# Patient Record
Sex: MALE | Race: WHITE | NOT HISPANIC OR LATINO | Employment: FULL TIME | URBAN - METROPOLITAN AREA
[De-identification: names, ages, dates, MRNs, and addresses within clinical notes are randomized per-mention and may not be internally consistent; named-entity substitution may affect disease eponyms.]

---

## 2017-04-17 ENCOUNTER — HOSPITAL ENCOUNTER (EMERGENCY)
Facility: OTHER | Age: 62
Discharge: HOME | End: 2017-04-18
Attending: EMERGENCY MEDICINE

## 2017-04-17 DIAGNOSIS — M54.2 NECK PAIN ON LEFT SIDE: ICD-10-CM

## 2017-04-17 DIAGNOSIS — R42 DIZZINESS: Primary | ICD-10-CM

## 2017-04-17 LAB
ALBUMIN SERPL-MCNC: 3.9 G/DL (ref 3.3–5.5)
ALP SERPL-CCNC: 67 U/L (ref 42–141)
BILIRUB SERPL-MCNC: 0.4 MG/DL (ref 0.2–1.6)
BUN SERPL-MCNC: 17 MG/DL (ref 7–22)
CALCIUM SERPL-MCNC: 9.4 MG/DL (ref 8–10.3)
CHLORIDE SERPL-SCNC: 105 MMOL/L (ref 98–108)
CREAT SERPL-MCNC: 1.1 MG/DL (ref 0.6–1.2)
GLUCOSE SERPL-MCNC: 99 MG/DL (ref 73–118)
POC ALT (SGPT): 20 U/L (ref 10–47)
POC AST (SGOT): 29 U/L (ref 11–38)
POC B-TYPE NATRIURETIC PEPTIDE: 14.8 PG/ML (ref 0–100)
POC CARDIAC TROPONIN I: 0 NG/ML
POC D-DI: <100 NG/ML (ref 0–450)
POC TCO2: 31 MMOL/L (ref 18–33)
POTASSIUM BLD-SCNC: 4.4 MMOL/L (ref 3.6–5.1)
PROTEIN, POC: 7.1 G/DL (ref 6.4–8.1)
SAMPLE: NORMAL
SODIUM BLD-SCNC: 138 MMOL/L (ref 128–145)

## 2017-04-17 PROCEDURE — 80185 ASSAY OF PHENYTOIN TOTAL: CPT

## 2017-04-17 PROCEDURE — 93010 ELECTROCARDIOGRAM REPORT: CPT | Mod: ,,, | Performed by: INTERNAL MEDICINE

## 2017-04-17 PROCEDURE — 93005 ELECTROCARDIOGRAM TRACING: CPT

## 2017-04-17 PROCEDURE — 99284 EMERGENCY DEPT VISIT MOD MDM: CPT | Mod: 25

## 2017-04-17 PROCEDURE — 80184 ASSAY OF PHENOBARBITAL: CPT

## 2017-04-17 NOTE — ED AVS SNAPSHOT
[facility] EMERGENCY DEPARTMENT  [address] [place] [address]               Jerry Durán   2017 10:05 PM   ED    Description:  Male : 1955   Department:  [facility] Emergency Department           Your Care was Coordinated By:     Provider Role From To    Kan Figueroa MD Attending Provider 17 [id number] --      Reason for Visit     Oral Swelling           Diagnoses this Visit        Comments    Dizziness    -  Primary     Neck pain on left side           ED Disposition     ED Disposition Condition Comment    Discharge  Patient discharged to home in stable condition.              To Do List           Follow-up Information     Follow up with Your PCP. Call in 1 day.    Why:  to schedule an appointment, for re-evaluation of today's complaint to arrange outpatient carotid ultrasound      [facility]     [facility] On Call Nurse Care Line -  Assistance  Unless otherwise directed by your provider, please contact [facility], our nurse care line that is available for  assistance.     Registered nurses in the [facility] On Call Center provide: appointment scheduling, clinical advisement, health education, and other advisory services.  Call: [phone] (toll free)               Medications           Message regarding Medications     Verify the changes and/or additions to your medication regime listed below are the same as discussed with your clinician today.  If any of these changes or additions are incorrect, please notify your healthcare provider.             Verify that the below list of medications is an accurate representation of the medications you are currently taking.  If none reported, the list may be blank. If incorrect, please contact your healthcare provider. Carry this list with you in case of emergency.                Clinical Reference Information           Your Vitals Were     BP Pulse Temp Resp Height Weight    198/101 69 98 °F (36.7 °C) 18 6' (1.829 m) 93 kg  (205 lb)    SpO2 BMI             100% 27.8 kg/m2         Allergies as of 4/18/2017     No Known Allergies      Immunizations Administered on Date of Encounter - 4/18/2017     None      ED Micro, Lab, POCT     Start Ordered       Status Ordering Provider    04/17/17 2334 04/17/17 2334  POCT B-type natriuretic peptide (BNP)  Once      Final result     04/17/17 2332 04/17/17 2332  Troponin ISTAT  Once      Final result     04/17/17 2331 04/17/17 2331  POCT D Dimer  Once      Final result     04/17/17 2307 04/17/17 2307  POCT CMP  Once      Final result     04/17/17 2235 04/17/17 2234  Phenytoin level, total  STAT      In process     04/17/17 2235 04/17/17 2234  Phenobarbital level  STAT      In process     04/17/17 2234 04/17/17 2234  POCT CBC  Once      Acknowledged     04/17/17 2234 04/17/17 2234  POCT CMP  Once      Completed     04/17/17 2234 04/17/17 2234  POCT B-type natriuretic peptide (BNP)  Once      Completed     04/17/17 2234 04/17/17 2234  POCT Troponin  Once      Completed     04/17/17 2234 04/17/17 2234  POCT D Dimer  Once      Completed       ED Imaging Orders     None        Discharge Instructions         Possible Causes of Dizziness or Fainting  Dizziness and fainting can have many causes. Below are some examples of possible causes your healthcare provider will look to rule out.    Benign paroxysmal positional vertigo (BPPV)  BPPV results when calcium crystals inside the inner ear shift into the wrong position. BPPV causes episodes of vertigo (a spinning sensation). Episodes most often happen when the head is moved in a certain way. This is more common in people 65 and older.   Infection or inflammation  The semicircular canals of the ear may become infected or inflamed. In this case, they can send the wrong balance signals. This can cause vertigo.  Meniere disease  Meniere disease happens when there is too much fluid in the semicircular canals. This can cause vertigo. It also can cause hearing  problems and buzzing or ringing in the ears (called tinnitus). You may also have a feeling of pressure or fullness in the ear.  Syncope  Syncope is fainting that happens when the brain doesnt get enough oxygen-rich blood. It can be caused by low heart rate or low blood pressure. This is called vasovagal syncope. It can also be caused by sitting or standing up too quickly. This is called orthostatic hypotension. Syncope may also be due to a heart valve problem, an abnormal heart rhythm, or other heart problems. Dizziness can also happen from stroke, hemorrhage in the brain, or other problems in the brain. Your healthcare provider may do certain tests to rule out these conditions.  Other causes  Other causes include:  · Medicines. Certain medicines can cause dizziness and even fainting. In some cases, stopping a medicine too quickly can lead to withdrawal symptoms, including dizziness and fainting.  · Anxiety. Being anxious can lead to breathing changes, such as hyperventilation. These can lead to dizziness and fainting.  Additional causes for dizziness and fainting also exist. Talk to your healthcare provider for more information.     Date Last Reviewed: 10/6/2015  © 5672-8022 [employer]. [address] [place] [address]. All rights reserved. This information is not intended as a substitute for professional medical care. Always follow your healthcare professional's instructions.          [facility] Sign-Up     Activating your [facility] account is as easy as 1-2-3!     1) Visit [email or web address], select Sign Up Now, enter this activation code and your date of birth, then select Next.  [email or web address]  Expires: 6/1/2017 11:59 PM      2) Create a username and password to use when you visit [facility] in the future and select a security question in case you lose your password and select Next.    3) Enter your e-mail address and click Sign Up!    Additional Information  If you have questions, please  e-mail [email or web address] or call [phone] to talk to our [facility] staff. Remember, [facility] is NOT to be used for urgent needs. For medical emergencies, dial 911.          [facility] Emergency Department complies with applicable Federal civil rights laws and does not discriminate on the basis of race, color, national origin, age, disability, or sex.        Language Assistance Services     ATTENTION: Language assistance services are available, free of charge. Please call [phone].      ATENCIÓN: Si habla español, tiene a loredo disposición servicios gratuitos de asistencia lingüística. Llame al [phone].     CHÚ Ý: N?u b?n nói Ti?ng Vi?t, có các d?ch v? h? tr? ngôn ng? mi?n phí dành cho b?n. G?i s? [phone].

## 2017-04-18 VITALS
HEIGHT: 72 IN | SYSTOLIC BLOOD PRESSURE: 172 MMHG | RESPIRATION RATE: 18 BRPM | DIASTOLIC BLOOD PRESSURE: 94 MMHG | BODY MASS INDEX: 27.77 KG/M2 | TEMPERATURE: 98 F | HEART RATE: 75 BPM | OXYGEN SATURATION: 99 % | WEIGHT: 205 LBS

## 2017-04-18 LAB
PHENOBARB SERPL-MCNC: 18.9 UG/DL
PHENYTOIN SERPL-MCNC: 13.7 UG/ML

## 2017-04-18 PROCEDURE — 80053 COMPREHEN METABOLIC PANEL: CPT

## 2017-04-18 PROCEDURE — 36415 COLL VENOUS BLD VENIPUNCTURE: CPT

## 2017-04-18 PROCEDURE — 85379 FIBRIN DEGRADATION QUANT: CPT

## 2017-04-18 PROCEDURE — 85025 COMPLETE CBC W/AUTO DIFF WBC: CPT

## 2017-04-18 PROCEDURE — 83880 ASSAY OF NATRIURETIC PEPTIDE: CPT

## 2017-04-18 PROCEDURE — 84484 ASSAY OF TROPONIN QUANT: CPT

## 2017-04-18 NOTE — DISCHARGE INSTRUCTIONS
Possible Causes of Dizziness or Fainting  Dizziness and fainting can have many causes. Below are some examples of possible causes your healthcare provider will look to rule out.    Benign paroxysmal positional vertigo (BPPV)  BPPV results when calcium crystals inside the inner ear shift into the wrong position. BPPV causes episodes of vertigo (a spinning sensation). Episodes most often happen when the head is moved in a certain way. This is more common in people 65 and older.   Infection or inflammation  The semicircular canals of the ear may become infected or inflamed. In this case, they can send the wrong balance signals. This can cause vertigo.  Meniere disease  Meniere disease happens when there is too much fluid in the semicircular canals. This can cause vertigo. It also can cause hearing problems and buzzing or ringing in the ears (called tinnitus). You may also have a feeling of pressure or fullness in the ear.  Syncope  Syncope is fainting that happens when the brain doesnt get enough oxygen-rich blood. It can be caused by low heart rate or low blood pressure. This is called vasovagal syncope. It can also be caused by sitting or standing up too quickly. This is called orthostatic hypotension. Syncope may also be due to a heart valve problem, an abnormal heart rhythm, or other heart problems. Dizziness can also happen from stroke, hemorrhage in the brain, or other problems in the brain. Your healthcare provider may do certain tests to rule out these conditions.  Other causes  Other causes include:  · Medicines. Certain medicines can cause dizziness and even fainting. In some cases, stopping a medicine too quickly can lead to withdrawal symptoms, including dizziness and fainting.  · Anxiety. Being anxious can lead to breathing changes, such as hyperventilation. These can lead to dizziness and fainting.  Additional causes for dizziness and fainting also exist. Talk to your healthcare provider for more  information.     Date Last Reviewed: 10/6/2015  © 8960-8669 [employer]. [address] [place] [address], [place] [address]. All rights reserved. This information is not intended as a substitute for professional medical care. Always follow your healthcare professional's instructions.

## 2017-04-18 NOTE — ED PROVIDER NOTES
Encounter Date: 4/17/2017       History     Chief Complaint   Patient presents with    Oral Swelling     pt c/o swelling to L side of neck, pt states that symptoms have improved s/p takin OTC asa, pt denies difficulty swallowing and breathing at this time     Review of patient's allergies indicates:  No Known Allergies  HPI 61 y.o. male presents to ED c/o  Acute dizziness yesterday while walking which resolved after sitting down for a few minutes.  Left-sided neck pressure today for 3-4 minutes. Symptoms currently resolved.   Denies history of MI, stroke    History of epilepsy on Dilantin and Phenobarbital. Last seizure 17 years ago.  Has not seen a PCP in over 4 years.     Past Medical History:   Diagnosis Date    Seizures      Past Surgical History:   Procedure Laterality Date    KNEE SURGERY       No family history on file.  Social History   Substance Use Topics    Smoking status: Never Smoker    Smokeless tobacco: None    Alcohol use No     Review of Systems   Constitutional: Negative for appetite change, chills, diaphoresis, fatigue and fever.   HENT: Negative.  Negative for sore throat, trouble swallowing and voice change.    Eyes: Negative.  Negative for visual disturbance.   Respiratory: Negative for cough, shortness of breath and stridor.    Cardiovascular: Negative for chest pain, palpitations and leg swelling.   Gastrointestinal: Negative for abdominal pain, constipation, nausea and vomiting.   Genitourinary: Negative for dysuria and hematuria.   Musculoskeletal: Positive for neck pain (left anterior neck pressure). Negative for back pain and gait problem.   Skin: Negative.    Neurological: Positive for dizziness. Negative for tremors, syncope, speech difficulty, weakness, light-headedness, numbness and headaches.   Psychiatric/Behavioral: Negative.    All other systems reviewed and are negative.      Physical Exam   Initial Vitals   BP Pulse Resp Temp SpO2   04/17/17 2100 04/17/17 2100 04/17/17  2100 04/17/17 2100 04/17/17 2100   198/101 69 18 98 °F (36.7 °C) 100 %     Physical Exam    Nursing note and vitals reviewed.  Constitutional: He appears well-developed and well-nourished. He is not diaphoretic. No distress.   HENT:   Head: Normocephalic and atraumatic.   Mouth/Throat: Oropharynx is clear and moist. No oropharyngeal exudate.   Eyes: EOM are normal. Pupils are equal, round, and reactive to light. No scleral icterus.   Neck: Normal range of motion and phonation normal. Neck supple. No stridor present. No tracheal tenderness present.   No carotid bruits appreciated on bilateral auscultation.   Cardiovascular: Normal rate, regular rhythm and intact distal pulses.   No murmur heard.  Pulmonary/Chest: Breath sounds normal. No stridor. No respiratory distress. He has no wheezes. He has no rhonchi. He exhibits no tenderness.   Abdominal: Soft. Bowel sounds are normal. There is no tenderness.   Musculoskeletal: Normal range of motion. He exhibits no edema.   Neurological: He is alert and oriented to person, place, and time. He has normal strength.   Skin: Skin is warm. No pallor.   Psychiatric: He has a normal mood and affect.         ED Course   Procedures  Labs Reviewed   POCT D DIMER - Abnormal; Notable for the following:        Result Value    POC D-DI <100 (*)     All other components within normal limits   PHENYTOIN LEVEL, TOTAL   PHENOBARBITAL LEVEL   TROPONIN ISTAT   POCT CMP   POCT B-TYPE NATRIURETIC PEPTIDE (BNP)   POCT TROPONIN   POCT D DIMER   POCT CMP   POCT B-TYPE NATRIURETIC PEPTIDE (BNP)     EKG Readings: (Independently Interpreted)   Initial Reading: No STEMI. Heart Rate: 62. T Waves Flipped: III. Axis: Normal. Other Impression: TW flattened aVF, V4-V6          Medical Decision Making:   Clinical Tests:   Lab Tests: Ordered and Reviewed  The following lab test(s) were unremarkable: CBC, CMP, D-Dimer, BNP and Troponin       <> Summary of Lab: White count 7.1 H&H 14.4 and 43.0 platelets 221  MCV 87.9 RDW 13  Medical Tests: Ordered  ED Management:      Patient call back info:  [phone] cell  [phone] wife cell                   ED Course     Labs Reviewed  Admission on 04/17/2017, Discharged on 04/18/2017   Component Date Value Ref Range Status    Phenytoin Lvl 04/17/2017 13.7  10.0 - 20.0 ug/mL Final    Phenobarbital 04/17/2017 18.9  15.0 - 40.0 ug/dL Final    Albumin, POC 04/17/2017 3.9  3.3 - 5.5 g/dL Final    Alkaline Phosphatase, POC 04/17/2017 67  42 - 141 U/L Final    ALT (SGPT), POC 04/17/2017 20  10 - 47 U/L Final    AST (SGOT), POC 04/17/2017 29  11 - 38 U/L Final    POC BUN 04/17/2017 17  7 - 22 mg/dL Final    Calcium, POC 04/17/2017 9.4  8.0 - 10.3 mg/dL Final    POC Chloride 04/17/2017 105  98 - 108 mmol/L Final    POC Creatinine 04/17/2017 1.1  0.6 - 1.2 mg/dL Final    POC Glucose 04/17/2017 99  73 - 118 mg/dL Final    POC Potassium 04/17/2017 4.4  3.6 - 5.1 mmol/L Final    POC Sodium 04/17/2017 138  128 - 145 mmol/L Final    Bilirubin 04/17/2017 0.4  0.2 - 1.6 mg/dL Final    POC TCO2 04/17/2017 31  18 - 33 mmol/L Final    Protein 04/17/2017 7.1  6.4 - 8.1 g/dL Final    POC D-DI 04/17/2017 <100* 0.0 - 450.0 ng/mL Final    POC B-Type Natriuretic Peptide 04/17/2017 14.8  0.0 - 100.0 pg/mL Final    POC Cardiac Troponin I 04/17/2017 0.00  <0.09 ng/mL Final    Sample 04/17/2017 UNK   Final        Imaging Reviewed    Imaging Results     None          Medications given in ED    Medications - No data to display     Patient discharged to home in stable condition with instructions to:   1. Please take all meds as prescribed.  2. Follow-up with your primary care doctor   3. Return precautions discussed and patient and/or family/caretaker understands to return to the emergency room for any concerns including worsening of your current symptoms, fever, chills, night sweats, worsening pain, chest pain, shortness of breath, nausea, vomiting, diarrhea, bleeding, headache,  difficulty talking, visual disturbances, weakness, numbness or any other acute concerns      Clinical Impression:   The primary encounter diagnosis was Dizziness. A diagnosis of Neck pain on left side was also pertinent to this visit.          Kan Figueroa MD  04/19/17 2006

## 2019-03-06 ENCOUNTER — OFFICE VISIT (OUTPATIENT)
Dept: URGENT CARE | Facility: CLINIC | Age: 64
End: 2019-03-06
Payer: COMMERCIAL

## 2019-03-06 VITALS
TEMPERATURE: 97 F | WEIGHT: 205 LBS | DIASTOLIC BLOOD PRESSURE: 88 MMHG | HEART RATE: 80 BPM | OXYGEN SATURATION: 98 % | BODY MASS INDEX: 27.77 KG/M2 | SYSTOLIC BLOOD PRESSURE: 140 MMHG | HEIGHT: 72 IN

## 2019-03-06 DIAGNOSIS — L02.214 ABSCESS OF RIGHT GROIN: Primary | ICD-10-CM

## 2019-03-06 PROCEDURE — 96372 PR INJECTION,THERAP/PROPH/DIAG2ST, IM OR SUBCUT: ICD-10-PCS | Mod: S$GLB,,, | Performed by: SURGERY

## 2019-03-06 PROCEDURE — 99203 PR OFFICE/OUTPT VISIT, NEW, LEVL III, 30-44 MIN: ICD-10-PCS | Mod: 25,S$GLB,, | Performed by: SURGERY

## 2019-03-06 PROCEDURE — 3008F BODY MASS INDEX DOCD: CPT | Mod: CPTII,S$GLB,, | Performed by: SURGERY

## 2019-03-06 PROCEDURE — 3008F PR BODY MASS INDEX (BMI) DOCUMENTED: ICD-10-PCS | Mod: CPTII,S$GLB,, | Performed by: SURGERY

## 2019-03-06 PROCEDURE — 99203 OFFICE O/P NEW LOW 30 MIN: CPT | Mod: 25,S$GLB,, | Performed by: SURGERY

## 2019-03-06 PROCEDURE — 96372 THER/PROPH/DIAG INJ SC/IM: CPT | Mod: S$GLB,,, | Performed by: SURGERY

## 2019-03-06 RX ORDER — CEFTRIAXONE 500 MG/1
500 INJECTION, POWDER, FOR SOLUTION INTRAMUSCULAR; INTRAVENOUS
Status: COMPLETED | OUTPATIENT
Start: 2019-03-06 | End: 2019-03-06

## 2019-03-06 RX ORDER — AMOXICILLIN AND CLAVULANATE POTASSIUM 875; 125 MG/1; MG/1
1 TABLET, FILM COATED ORAL 2 TIMES DAILY
Qty: 14 TABLET | Refills: 0 | Status: SHIPPED | OUTPATIENT
Start: 2019-03-06 | End: 2019-03-13

## 2019-03-06 RX ADMIN — CEFTRIAXONE 500 MG: 500 INJECTION, POWDER, FOR SOLUTION INTRAMUSCULAR; INTRAVENOUS at 06:03

## 2019-03-06 NOTE — PROGRESS NOTES
Subjective:       Patient ID: Jerry Durán is a 63 y.o. male.    Vitals:  height is 6' (1.829 m) and weight is 93 kg (205 lb). His temperature is 97.1 °F (36.2 °C). His blood pressure is 140/88 (abnormal) and his pulse is 80. His oxygen saturation is 98%.     Chief Complaint: Abscess (right testicle)    Pt states having a abscess on his right testicle       Abscess   Chronicity:  NewProgression Since Onset: gradually worsening  Location:  Ano-genital  Associated Symptoms: no fever, no chills  Characteristics: draining and painful    Pain Scale:  4/10  Treatments Tried:  Warm compresses      Constitution: Negative for chills, fatigue and fever.   HENT: Negative for congestion and sore throat.    Neck: Negative for painful lymph nodes.   Cardiovascular: Negative for chest pain and leg swelling.   Eyes: Negative for double vision and blurred vision.   Respiratory: Negative for cough and shortness of breath.    Gastrointestinal: Negative for nausea, vomiting and diarrhea.   Genitourinary: Negative for dysuria, frequency and urgency.   Musculoskeletal: Negative for joint pain, joint swelling, muscle cramps and muscle ache.   Skin: Positive for abscess. Negative for color change, pale, rash and erythema.   Allergic/Immunologic: Negative for seasonal allergies.   Neurological: Negative for dizziness, history of vertigo, light-headedness, passing out and headaches.   Hematologic/Lymphatic: Negative for swollen lymph nodes, easy bruising/bleeding and history of blood clots. Does not bruise/bleed easily.   Psychiatric/Behavioral: Negative for nervous/anxious, sleep disturbance and depression. The patient is not nervous/anxious.        Objective:      Physical Exam   Constitutional: He is oriented to person, place, and time. He appears well-developed and well-nourished.   HENT:   Head: Normocephalic and atraumatic. Head is without abrasion, without contusion and without laceration.   Right Ear: External ear normal.   Left  Ear: External ear normal.   Nose: Nose normal.   Mouth/Throat: Oropharynx is clear and moist.   Eyes: Conjunctivae, EOM and lids are normal. Pupils are equal, round, and reactive to light.   Neck: Trachea normal, full passive range of motion without pain and phonation normal. Neck supple.   Cardiovascular: Normal rate, regular rhythm and normal heart sounds.   Pulmonary/Chest: Effort normal and breath sounds normal. No stridor. No respiratory distress.   Genitourinary: Testes normal and penis normal.         Musculoskeletal: Normal range of motion.   Neurological: He is alert and oriented to person, place, and time.   Skin: Skin is warm, dry and intact. Capillary refill takes less than 2 seconds. No abrasion, no bruising, no burn, no ecchymosis, no laceration, no lesion and no rash noted. No erythema.   Psychiatric: He has a normal mood and affect. His speech is normal and behavior is normal. Judgment and thought content normal. Cognition and memory are normal.   Nursing note and vitals reviewed.      Assessment:       1. Abscess of right groin        Plan:         Abscess of right groin  -     cefTRIAXone injection 500 mg  -     amoxicillin-clavulanate 875-125mg (AUGMENTIN) 875-125 mg per tablet; Take 1 tablet by mouth 2 (two) times daily. for 7 days  Dispense: 14 tablet; Refill: 0      Patient Instructions     Abscess (Antibiotic Treatment Only)  An abscess (sometimes called a boil) happens when bacteria get trapped under the skin and start to grow. Pus forms inside the abscess as the body responds to the bacteria. An abscess can happen with an insect bite, ingrown hair, blocked oil gland, pimple, cyst, or puncture wound.  In the early stages, your wound may be red and tender. For this stage, you may get antibiotics. If the abscess does not get better with antibiotics, it will need to be drained with a small cut.  Home care  These tips will help you care for your abscess at home:  · Soak the wound in hot water  or apply hot packs (small towel soaked in hot water) to the area for 20 minutes at a time. Do this 3 to 4 times a day.  · Do not cut, squeeze, or pop the boil yourself.  · Apply antibiotic cream or ointment to the skin 3 to 4 times a day, unless something else was prescribed. Some ointments include an antibiotic plus a pain reliever.  · If your doctor prescribed antibiotics, do not stop taking them until you have finished the medicine or the doctor tells you to stop.  · You may use an over-the-counter pain medicine to control pain, unless another pain medicine was prescribed. If you have chronic liver or kidney disease or ever had a stomach ulcer or gastrointestinal bleeding, talk with your doctor before using these any of these.  Follow-up care  Follow up with your healthcare provider, or as advised. Check your wound each day for the signs of worsening infection listed below.  When to seek medical advice  Get prompt medical attention if any of these occur:  · An increase in redness or swelling  · Red streaks in the skin leading away from the abscess  · An increase in local pain or swelling  · Fever of 100.4ºF (38ºC) or higher, or as directed by your healthcare provider  · Pus or fluid coming from the abscess  · Boil returns after getting better  Date Last Reviewed: 9/1/2016  © 4751-0528 [employer]. [address] [place] [address], [place] [address]. All rights reserved. This information is not intended as a substitute for professional medical care. Always follow your healthcare professional's instructions.

## 2019-03-07 NOTE — PATIENT INSTRUCTIONS
Abscess (Antibiotic Treatment Only)  An abscess (sometimes called a boil) happens when bacteria get trapped under the skin and start to grow. Pus forms inside the abscess as the body responds to the bacteria. An abscess can happen with an insect bite, ingrown hair, blocked oil gland, pimple, cyst, or puncture wound.  In the early stages, your wound may be red and tender. For this stage, you may get antibiotics. If the abscess does not get better with antibiotics, it will need to be drained with a small cut.  Home care  These tips will help you care for your abscess at home:  · Soak the wound in hot water or apply hot packs (small towel soaked in hot water) to the area for 20 minutes at a time. Do this 3 to 4 times a day.  · Do not cut, squeeze, or pop the boil yourself.  · Apply antibiotic cream or ointment to the skin 3 to 4 times a day, unless something else was prescribed. Some ointments include an antibiotic plus a pain reliever.  · If your doctor prescribed antibiotics, do not stop taking them until you have finished the medicine or the doctor tells you to stop.  · You may use an over-the-counter pain medicine to control pain, unless another pain medicine was prescribed. If you have chronic liver or kidney disease or ever had a stomach ulcer or gastrointestinal bleeding, talk with your doctor before using these any of these.  Follow-up care  Follow up with your healthcare provider, or as advised. Check your wound each day for the signs of worsening infection listed below.  When to seek medical advice  Get prompt medical attention if any of these occur:  · An increase in redness or swelling  · Red streaks in the skin leading away from the abscess  · An increase in local pain or swelling  · Fever of 100.4ºF (38ºC) or higher, or as directed by your healthcare provider  · Pus or fluid coming from the abscess  · Boil returns after getting better  Date Last Reviewed: 9/1/2016  © 2565-7788 [employer] [address] [place] [address]. All rights reserved. This information is not intended as a substitute for professional medical care. Always follow your healthcare professional's instructions.

## 2019-03-20 ENCOUNTER — OFFICE VISIT (OUTPATIENT)
Dept: URGENT CARE | Facility: CLINIC | Age: 64
End: 2019-03-20
Payer: COMMERCIAL

## 2019-03-20 VITALS
BODY MASS INDEX: 27.8 KG/M2 | HEART RATE: 57 BPM | SYSTOLIC BLOOD PRESSURE: 175 MMHG | DIASTOLIC BLOOD PRESSURE: 93 MMHG | WEIGHT: 205 LBS | OXYGEN SATURATION: 100 % | TEMPERATURE: 98 F

## 2019-03-20 DIAGNOSIS — L02.214 ABSCESS OF RIGHT GROIN: Primary | ICD-10-CM

## 2019-03-20 PROBLEM — R56.9 SEIZURE: Status: ACTIVE | Noted: 2019-03-20

## 2019-03-20 PROCEDURE — 99214 OFFICE O/P EST MOD 30 MIN: CPT | Mod: S$GLB,,, | Performed by: SURGERY

## 2019-03-20 PROCEDURE — 99214 PR OFFICE/OUTPT VISIT, EST, LEVL IV, 30-39 MIN: ICD-10-PCS | Mod: S$GLB,,, | Performed by: SURGERY

## 2019-03-20 PROCEDURE — 3008F PR BODY MASS INDEX (BMI) DOCUMENTED: ICD-10-PCS | Mod: CPTII,S$GLB,, | Performed by: SURGERY

## 2019-03-20 PROCEDURE — 3008F BODY MASS INDEX DOCD: CPT | Mod: CPTII,S$GLB,, | Performed by: SURGERY

## 2019-03-20 RX ORDER — PHENYTOIN SODIUM 100 MG/1
100 CAPSULE, EXTENDED RELEASE ORAL 2 TIMES DAILY
COMMUNITY

## 2019-03-20 RX ORDER — MUPIROCIN 20 MG/G
OINTMENT TOPICAL DAILY
Qty: 1 TUBE | Refills: 0 | Status: SHIPPED | OUTPATIENT
Start: 2019-03-20 | End: 2019-05-28

## 2019-03-20 RX ORDER — SULFAMETHOXAZOLE AND TRIMETHOPRIM 800; 160 MG/1; MG/1
1 TABLET ORAL 2 TIMES DAILY
Qty: 14 TABLET | Refills: 0 | Status: SHIPPED | OUTPATIENT
Start: 2019-03-20 | End: 2019-03-27

## 2019-03-20 NOTE — PROGRESS NOTES
Subjective:       Patient ID: Jerry Durán is a 63 y.o. male.    Vitals:  weight is 93 kg (205 lb). His temperature is 98 °F (36.7 °C). His blood pressure is 175/93 (abnormal) and his pulse is 57 (abnormal). His oxygen saturation is 100%.     Chief Complaint: Abscess    Pt completed all medication for his abscess. The affected area looks good over all. Pt states it is still draining and would like the area re-checked to make sure its healing.      Abscess   Chronicity: Recheck from 3/6/19 visit.Progression Since Onset: gradually improving  Abscess location: groin.  Associated Symptoms: no fever, no chills  Characteristics: draining    Pain Scale:  0/10  Treatments Tried:  Oral antibiotics and warm water soaks      Constitution: Negative for chills, fatigue and fever.   HENT: Negative for congestion and sore throat.    Neck: Negative for painful lymph nodes.   Cardiovascular: Negative for chest pain and leg swelling.   Eyes: Negative for double vision and blurred vision.   Respiratory: Negative for cough and shortness of breath.    Gastrointestinal: Negative for nausea, vomiting and diarrhea.   Genitourinary: Negative for dysuria, frequency and urgency.   Musculoskeletal: Negative for joint pain, joint swelling, muscle cramps and muscle ache.   Skin: Positive for erythema and abscess. Negative for color change, pale and rash.   Allergic/Immunologic: Negative for seasonal allergies.   Neurological: Negative for dizziness, history of vertigo, light-headedness, passing out and headaches.   Hematologic/Lymphatic: Negative for swollen lymph nodes, easy bruising/bleeding and history of blood clots. Does not bruise/bleed easily.   Psychiatric/Behavioral: Negative for nervous/anxious, sleep disturbance and depression. The patient is not nervous/anxious.        Objective:      Physical Exam   Constitutional: He is oriented to person, place, and time. He appears well-developed and well-nourished.   HENT:   Head: Normocephalic  and atraumatic. Head is without abrasion, without contusion and without laceration.   Right Ear: External ear normal.   Left Ear: External ear normal.   Nose: Nose normal.   Mouth/Throat: Oropharynx is clear and moist.   Eyes: Conjunctivae, EOM and lids are normal. Pupils are equal, round, and reactive to light.   Neck: Trachea normal, full passive range of motion without pain and phonation normal. Neck supple.   Cardiovascular: Normal rate, regular rhythm and normal heart sounds.   Pulmonary/Chest: Effort normal and breath sounds normal. No stridor. No respiratory distress.   Genitourinary:         Musculoskeletal: Normal range of motion.   Neurological: He is alert and oriented to person, place, and time.   Skin: Skin is warm, dry and intact. Capillary refill takes less than 2 seconds. No abrasion, no bruising, no burn, no ecchymosis, no laceration, no lesion and no rash noted. There is erythema.   Psychiatric: He has a normal mood and affect. His speech is normal and behavior is normal. Judgment and thought content normal. Cognition and memory are normal.   Nursing note and vitals reviewed.      Assessment:       1. Abscess of right groin        Plan:         Abscess of right groin  -     sulfamethoxazole-trimethoprim 800-160mg (BACTRIM DS) 800-160 mg Tab; Take 1 tablet by mouth 2 (two) times daily. for 7 days  Dispense: 14 tablet; Refill: 0  -     mupirocin (BACTROBAN) 2 % ointment; Apply topically once daily.  Dispense: 1 Tube; Refill: 0          Patient Instructions     Abscess (Antibiotic Treatment Only)  An abscess (sometimes called a boil) happens when bacteria get trapped under the skin and start to grow. Pus forms inside the abscess as the body responds to the bacteria. An abscess can happen with an insect bite, ingrown hair, blocked oil gland, pimple, cyst, or puncture wound.  In the early stages, your wound may be red and tender. For this stage, you may get antibiotics. If the abscess does not get  better with antibiotics, it will need to be drained with a small cut.  Home care  These tips will help you care for your abscess at home:  · Soak the wound in hot water or apply hot packs (small towel soaked in hot water) to the area for 20 minutes at a time. Do this 3 to 4 times a day.  · Do not cut, squeeze, or pop the boil yourself.  · Apply antibiotic cream or ointment to the skin 3 to 4 times a day, unless something else was prescribed. Some ointments include an antibiotic plus a pain reliever.  · If your doctor prescribed antibiotics, do not stop taking them until you have finished the medicine or the doctor tells you to stop.  · You may use an over-the-counter pain medicine to control pain, unless another pain medicine was prescribed. If you have chronic liver or kidney disease or ever had a stomach ulcer or gastrointestinal bleeding, talk with your doctor before using these any of these.  Follow-up care  Follow up with your healthcare provider, or as advised. Check your wound each day for the signs of worsening infection listed below.  When to seek medical advice  Get prompt medical attention if any of these occur:  · An increase in redness or swelling  · Red streaks in the skin leading away from the abscess  · An increase in local pain or swelling  · Fever of 100.4ºF (38ºC) or higher, or as directed by your healthcare provider  · Pus or fluid coming from the abscess  · Boil returns after getting better  Date Last Reviewed: 9/1/2016  © 5700-3267 [employer]. [address] [place] [address], [place] [address]. All rights reserved. This information is not intended as a substitute for professional medical care. Always follow your healthcare professional's instructions.

## 2019-03-20 NOTE — PATIENT INSTRUCTIONS
Abscess (Antibiotic Treatment Only)  An abscess (sometimes called a boil) happens when bacteria get trapped under the skin and start to grow. Pus forms inside the abscess as the body responds to the bacteria. An abscess can happen with an insect bite, ingrown hair, blocked oil gland, pimple, cyst, or puncture wound.  In the early stages, your wound may be red and tender. For this stage, you may get antibiotics. If the abscess does not get better with antibiotics, it will need to be drained with a small cut.  Home care  These tips will help you care for your abscess at home:  · Soak the wound in hot water or apply hot packs (small towel soaked in hot water) to the area for 20 minutes at a time. Do this 3 to 4 times a day.  · Do not cut, squeeze, or pop the boil yourself.  · Apply antibiotic cream or ointment to the skin 3 to 4 times a day, unless something else was prescribed. Some ointments include an antibiotic plus a pain reliever.  · If your doctor prescribed antibiotics, do not stop taking them until you have finished the medicine or the doctor tells you to stop.  · You may use an over-the-counter pain medicine to control pain, unless another pain medicine was prescribed. If you have chronic liver or kidney disease or ever had a stomach ulcer or gastrointestinal bleeding, talk with your doctor before using these any of these.  Follow-up care  Follow up with your healthcare provider, or as advised. Check your wound each day for the signs of worsening infection listed below.  When to seek medical advice  Get prompt medical attention if any of these occur:  · An increase in redness or swelling  · Red streaks in the skin leading away from the abscess  · An increase in local pain or swelling  · Fever of 100.4ºF (38ºC) or higher, or as directed by your healthcare provider  · Pus or fluid coming from the abscess  · Boil returns after getting better  Date Last Reviewed: 9/1/2016  © 8467-8502 [employer] [address] [place] [address]. All rights reserved. This information is not intended as a substitute for professional medical care. Always follow your healthcare professional's instructions.

## 2019-05-28 ENCOUNTER — OFFICE VISIT (OUTPATIENT)
Dept: FAMILY MEDICINE | Facility: CLINIC | Age: 64
End: 2019-05-28
Payer: COMMERCIAL

## 2019-05-28 VITALS
OXYGEN SATURATION: 95 % | DIASTOLIC BLOOD PRESSURE: 90 MMHG | WEIGHT: 200 LBS | TEMPERATURE: 98 F | HEART RATE: 68 BPM | HEIGHT: 72 IN | SYSTOLIC BLOOD PRESSURE: 168 MMHG | BODY MASS INDEX: 27.09 KG/M2

## 2019-05-28 DIAGNOSIS — L72.9 SCROTAL CYST: Primary | ICD-10-CM

## 2019-05-28 DIAGNOSIS — R03.0 ELEVATED BLOOD PRESSURE READING: ICD-10-CM

## 2019-05-28 PROCEDURE — 99999 PR PBB SHADOW E&M-EST. PATIENT-LVL IV: CPT | Mod: PBBFAC,,, | Performed by: INTERNAL MEDICINE

## 2019-05-28 PROCEDURE — 3008F PR BODY MASS INDEX (BMI) DOCUMENTED: ICD-10-PCS | Mod: CPTII,S$GLB,, | Performed by: INTERNAL MEDICINE

## 2019-05-28 PROCEDURE — 99999 PR PBB SHADOW E&M-EST. PATIENT-LVL IV: ICD-10-PCS | Mod: PBBFAC,,, | Performed by: INTERNAL MEDICINE

## 2019-05-28 PROCEDURE — 99203 OFFICE O/P NEW LOW 30 MIN: CPT | Mod: S$GLB,,, | Performed by: INTERNAL MEDICINE

## 2019-05-28 PROCEDURE — 99203 PR OFFICE/OUTPT VISIT, NEW, LEVL III, 30-44 MIN: ICD-10-PCS | Mod: S$GLB,,, | Performed by: INTERNAL MEDICINE

## 2019-05-28 PROCEDURE — 3008F BODY MASS INDEX DOCD: CPT | Mod: CPTII,S$GLB,, | Performed by: INTERNAL MEDICINE

## 2019-05-28 RX ORDER — PHENOBARBITAL 30 MG/1
30 TABLET ORAL EVERY MORNING
COMMUNITY
End: 2019-09-17 | Stop reason: ALTCHOICE

## 2019-05-28 NOTE — PROGRESS NOTES
This note was created by combination of typed  and Dragon dictation.  Transcription errors may be present.  If there are any questions, please contact me.    Assessment & Plan:   Scrotal cyst  -not related or bound to underlying structures, reassurance.  He does find it bothersome and after discussion I will refer him to Urology for evaluation and consideration of excision.  No indication for antibiotics at this time.  -     Ambulatory referral to Urology    Elevated blood pressure reading  -he reports that it is better outside the office and possibly some degree of white coat syndrome.  I have asked him to continue to monitor and to set up a physical exam at some point in the next 6 months for re-evaluation    Medications Discontinued During This Encounter   Medication Reason    mupirocin (BACTROBAN) 2 % ointment Patient no longer taking    phenobarbital in dextrose 5 % infusion        meds sent this encounter:       Follow Up: No follow-ups on file.    Subjective:     Chief Complaint   Patient presents with    Mass     Swelling of right testicle       AMOS Edwards is a 63 y.o. male, last appointment with this clinic was Visit date not found.    NP  His former PCP retired - Dr. Fernández - had been seeing him for 10 years.    Seen in March for abscess in the right groin.  Rocephin injection and Augmentin.    Comes in today complaining of a lump on the right side of his scrotum.  He is concerned that this may represent malignancy.  Went to UC and treated for infection b/c of purulent drainage.  But never went away completely.  Has decreased in size but did not resolve completely.  It is aggravating/annoying but no pain. No other areas of skin on body that develop cysts. No fever no chills.    Neuro Dr. Paredes until she retired.    Colonoscopy was age 52 at . Normal but due.    BP high on intake. BP rare outside checks - 120/80s. 130/80s.  Feels like there is a component of white coat syndrome.    Patient Care  Team:  Primary Doctor No as PCP - General  John Jones MD (Neurology)  Pricila Paredes MD as Consulting Physician (Neurology)  [facility] (Gastroenterology)    Patient Active Problem List    Diagnosis Date Noted    Seizure 03/20/2019       PAST MEDICAL HISTORY:  Past Medical History:   Diagnosis Date    Seizures        PAST SURGICAL HISTORY:  Past Surgical History:   Procedure Laterality Date    KNEE SURGERY       Family History   Problem Relation Age of Onset    Breast cancer Mother     No Known Problems Father     Thyroid disease Sister     No Known Problems Brother     No Known Problems Brother     No Known Problems Sister     No Known Problems Son     Seizures Daughter        SOCIAL HISTORY:  Social History     Socioeconomic History    Marital status:      Spouse name: Not on file    Number of children: Not on file    Years of education: Not on file    Highest education level: Not on file   Occupational History    Occupation: [employer] -    Social Needs    Financial resource strain: Not on file    Food insecurity:     Worry: Not on file     Inability: Not on file    Transportation needs:     Medical: Not on file     Non-medical: Not on file   Tobacco Use    Smoking status: Never Smoker   Substance and Sexual Activity    Alcohol use: No    Drug use: No    Sexual activity: Not on file   Lifestyle    Physical activity:     Days per week: Not on file     Minutes per session: Not on file    Stress: Not on file   Relationships    Social connections:     Talks on phone: Not on file     Gets together: Not on file     Attends Latter-day service: Not on file     Active member of club or organization: Not on file     Attends meetings of clubs or organizations: Not on file     Relationship status: Not on file   Other Topics Concern    Not on file   Social History Narrative    Not on file       ALLERGIES AND MEDICATIONS:  updated and reviewed.  Review of patient's allergies indicates:  No Known Allergies  Current Outpatient Medications   Medication Sig Dispense Refill    PHENobarbital (LUMINAL) 30 MG tablet 1 AM 2 in evening      phenytoin (DILANTIN EXTENDED) 100 MG ER capsule Take 100 tablets by mouth 3 (three) times daily. 1 in AM, 2 in PM      phenytoin (DILANTIN) 30 MG ER capsule Take 30 tablets by mouth once daily.       No current facility-administered medications for this visit.        Review of Systems   Constitutional: Negative for chills and fever.   Respiratory: Negative for cough.    Cardiovascular: Negative for chest pain and palpitations.   Gastrointestinal: Negative for abdominal pain.   Genitourinary: Negative for dysuria.   Neurological: Negative for headaches.       Objective:   Physical Exam   Vitals:    05/28/19 1527   BP: (!) 168/90   BP Location: Left arm   Patient Position: Sitting   BP Method: Medium (Manual)   Pulse: 68   Temp: 98 °F (36.7 °C)   TempSrc: Oral   SpO2: 95%   Weight: 90.7 kg (200 lb)   Height: 6' (1.829 m)    Body mass index is 27.12 kg/m².  Weight: 90.7 kg (200 lb)   Height: 6' (182.9 cm)     Physical Exam   Constitutional: He is oriented to person, place, and time. He appears well-developed and well-nourished. No distress.   Eyes: EOM are normal.   Cardiovascular: Normal rate, regular rhythm and normal heart sounds.   No murmur heard.  Pulmonary/Chest: Effort normal and breath sounds normal.   Genitourinary:   Genitourinary Comments: On the lateral surface of the scrotum as it meets with the perineal tissue, there is a subcutaneous cystic area, soft, it is about 25 mm long times 8 mm wide, without overlying comedone, no erythema no warmth, maybe a little fluctuant, but not firm not tender.  It is free of the underlying scrotal contents.  The testicles are unremarkable smooth in contour with no masses. Spermatic cord unremarkable.  No hernias.   Musculoskeletal: Normal range of motion.    Neurological: He is alert and oriented to person, place, and time. Coordination normal.   Skin: Skin is warm and dry.   Psychiatric: He has a normal mood and affect. His behavior is normal. Thought content normal.

## 2019-05-28 NOTE — LETTER
May 28, 2019    [facility]               [facility]  [address]  [place] [address]  Phone: [phone]  Fax: [phone] May 28, 2019     Patient: Jerry Durán    YOB: 1955   Date of Visit: 5/28/2019     To Whom It May Concern:    We are seeing Jerry Durán in the clinic at [facility].  Primary Doctor No is the patient's PCP.  She/He has an outstanding lab/procedure at the time we reviewed his/her chart.  To help with our Health Maintenance records will you please supply the following report(s)       (  )  MAMMOGRAM                                             ( x )  COLONOSCOPY      (  )  PAP SMEAR                                                 (  )  OUTSIDE LAB RESULTS     (  )  DEXA SCAN                                                 (  )  EYE EXAM            (  )  FOOT EXAM                                                 (  )  ENTIRE RECORD     (  )  OUTSIDE IMMUNIZATIONS                        (  )  _______________         Please Fax to [facility], Primary Doctor No [phone]     If any questions please contact [place] at [phone]

## 2019-06-05 ENCOUNTER — TELEPHONE (OUTPATIENT)
Dept: FAMILY MEDICINE | Facility: CLINIC | Age: 64
End: 2019-06-05

## 2019-06-05 NOTE — LETTER
June 5, 2019    Jerry Durán  [address] [facility] [address]             [facility]  [address] [facility] [address]  Phone: [phone]  Fax: [phone] Dear Mr. Durán:    I have been unable to reach you by phone for your appointment toUrology .  Please call me at the clinic [phone] to book your appointment.      If you have any questions or concerns, please don't hesitate to call.    Sincerely,        Nellie Melendrez MA

## 2019-06-13 ENCOUNTER — OFFICE VISIT (OUTPATIENT)
Dept: UROLOGY | Facility: CLINIC | Age: 64
End: 2019-06-13
Payer: COMMERCIAL

## 2019-06-13 VITALS
SYSTOLIC BLOOD PRESSURE: 118 MMHG | DIASTOLIC BLOOD PRESSURE: 62 MMHG | HEART RATE: 60 BPM | BODY MASS INDEX: 27.11 KG/M2 | RESPIRATION RATE: 14 BRPM | HEIGHT: 72 IN | WEIGHT: 200.19 LBS

## 2019-06-13 DIAGNOSIS — L02.214 GROIN ABSCESS: ICD-10-CM

## 2019-06-13 DIAGNOSIS — L72.9 SCROTAL CYST: Primary | ICD-10-CM

## 2019-06-13 PROCEDURE — 99999 PR PBB SHADOW E&M-EST. PATIENT-LVL III: CPT | Mod: PBBFAC,,, | Performed by: UROLOGY

## 2019-06-13 PROCEDURE — 99244 OFF/OP CNSLTJ NEW/EST MOD 40: CPT | Mod: S$GLB,,, | Performed by: UROLOGY

## 2019-06-13 PROCEDURE — 99244 PR OFFICE CONSULTATION,LEVEL IV: ICD-10-PCS | Mod: S$GLB,,, | Performed by: UROLOGY

## 2019-06-13 PROCEDURE — 99999 PR PBB SHADOW E&M-EST. PATIENT-LVL III: ICD-10-PCS | Mod: PBBFAC,,, | Performed by: UROLOGY

## 2019-06-13 RX ORDER — SULFAMETHOXAZOLE AND TRIMETHOPRIM 400; 80 MG/1; MG/1
1 TABLET ORAL 2 TIMES DAILY
Qty: 28 TABLET | Refills: 0 | Status: SHIPPED | OUTPATIENT
Start: 2019-06-13 | End: 2019-06-27

## 2019-06-13 NOTE — PROGRESS NOTES
"Subjective:       Jerry Durán is a 64 y.o. male who is a new patient who was referred by Dr Cabrales for evaluation of "scrotal cyst".      He reports lump in R scrotum. Saw PCP and  for this previously. At  in 3/19, he had purulent drainage but did not resolve completely after that. Drained spontaneously and I&D not performed. He has noted the "cyst" since then. He has had oozing from the same area since then. Pain resolved.     No GI issues. No prior abscess or skin cysts. Denies voiding problems.       The following portions of the patient's history were reviewed and updated as appropriate: allergies, current medications, past family history, past medical history, past social history, past surgical history and problem list.    Review of Systems  Constitutional: no fever or chills  ENT: no nasal congestion or sore throat  Respiratory: no cough or shortness of breath  Cardiovascular: no chest pain or palpitations  Gastrointestinal: no nausea or vomiting, tolerating diet  Genitourinary: as per HPI  Hematologic/Lymphatic: no easy bruising or lymphadenopathy  Musculoskeletal: no arthralgias or myalgias  Skin: no rashes or lesions  Neurological: no seizures or tremors  Behavioral/Psych: no auditory or visual hallucinations       Objective:    Vitals: /62   Pulse 60   Resp 14   Ht 6' (1.829 m)   Wt 90.8 kg (200 lb 2.8 oz)   BMI 27.15 kg/m²     Physical Exam   General: well developed, well nourished in no acute distress  Head: normocephalic, atraumatic  Neck: supple, trachea midline, no obvious enlargement of thyroid  HEENT: EOMI, mucus membranes moist, sclera anicteric, no hearing impairment  Lungs: symmetric expansion, non-labored breathing  Cardiovascular: regular rate and rhythm, normal pulses  Abdomen: soft, non tender, non distended, no palpable masses, no hepatosplenomegaly, no hernias, bladder nonpalpable. No CVA tenderness.  Musculoskeletal: no peripheral edema, normal ROM in bilateral upper and " lower extremities  Lymphatics: no cervical or inguinal lymphadenopathy  Skin: no rashes or lesions  Neuro: alert and oriented x 3, no gross deficits  Psych: normal judgment and insight, normal mood/affect and non-anxious  Genitourinary:   Penis -  circumcised penis without plaques, lesions, or scarring.  Urethra - orthotopic location without stenosis.  Scrotum  - no lesions or rashes, no hydrocele or hernia.  Testes - descended bilaterally, symmetric without masses, non tender.  Epididymides - no cysts or lesions, no spermatocele, symmetric   Oval shaped area in R proximal lateral scrotum (approx 4cm x 2cm) with 2 areas of white drainage noted. No cellulitis, crepitus. Nontender. Lesion is near cord structures.   ANDREW: patient declined exam      Lab Review   Urine analysis today in clinic shows Negative     No results found for: WBC, HGB, HCT, MCV, PLT  No results found for: CREATININE, BUN  No results found for: PSA  No results found for: PSADIAG    Imaging  NA       Assessment/Plan:      1. Scrotal cyst    - Discussed surgical excision. Does not seem well encapsulated but could try excision of abnormal area to allow closure of wound after. He is apprehensive for a procedure.    - Reviewed possible s/s of worsening infection requiring I&D requiring packing, healing by secondary intention.   - He has opted to try another round of abx first prior to surgery. Suspect this will not clear wound completely but okay to try first as wound appears stable.    - Bactrim x 14 d   - Recheck wound in ~2 weeks     2. Groin abscess    - Will monitor. Recommend excision       Follow up in 2 weeks

## 2019-06-13 NOTE — LETTER
June 13, 2019      Jasbir Cabrales MD  [address] [facility]  [place] [address]           [facility]  [address] [facility] [address]  [place] [address]  Phone: [phone]  Fax: [phone]          Patient: Jerry Durán   MR Number: [id number]   YOB: 1955   Date of Visit: 6/13/2019       Dear Dr. Jasbir Cabrales:    Thank you for referring Jerry Durán to me for evaluation. Attached you will find relevant portions of my assessment and plan of care.    If you have questions, please do not hesitate to call me. I look forward to following Jerry Durán along with you.    Sincerely,    aPt Garrett MD    Enclosure  CC:  No Recipients    If you would like to receive this communication electronically, please contact [email or web address] or [phone] to request more information on [employer] access.    For providers and/or their staff who would like to refer a patient to [facility], please contact us through our one-stop-shop provider referral line, [facility], at [phone].    If you feel you have received this communication in error or would no longer like to receive these types of communications, please e-mail [email or web address]

## 2019-06-19 ENCOUNTER — OFFICE VISIT (OUTPATIENT)
Dept: UROLOGY | Facility: CLINIC | Age: 64
End: 2019-06-19
Payer: COMMERCIAL

## 2019-06-19 VITALS — BODY MASS INDEX: 26.88 KG/M2 | HEIGHT: 72 IN | WEIGHT: 198.44 LBS | RESPIRATION RATE: 16 BRPM

## 2019-06-19 DIAGNOSIS — L72.9 SCROTAL CYST: Primary | ICD-10-CM

## 2019-06-19 PROCEDURE — 99999 PR PBB SHADOW E&M-EST. PATIENT-LVL III: CPT | Mod: PBBFAC,,, | Performed by: NURSE PRACTITIONER

## 2019-06-19 PROCEDURE — 3008F BODY MASS INDEX DOCD: CPT | Mod: CPTII,S$GLB,, | Performed by: NURSE PRACTITIONER

## 2019-06-19 PROCEDURE — 99999 PR PBB SHADOW E&M-EST. PATIENT-LVL III: ICD-10-PCS | Mod: PBBFAC,,, | Performed by: NURSE PRACTITIONER

## 2019-06-19 PROCEDURE — 99213 OFFICE O/P EST LOW 20 MIN: CPT | Mod: S$GLB,,, | Performed by: NURSE PRACTITIONER

## 2019-06-19 PROCEDURE — 3008F PR BODY MASS INDEX (BMI) DOCUMENTED: ICD-10-PCS | Mod: CPTII,S$GLB,, | Performed by: NURSE PRACTITIONER

## 2019-06-19 PROCEDURE — 99213 PR OFFICE/OUTPT VISIT, EST, LEVL III, 20-29 MIN: ICD-10-PCS | Mod: S$GLB,,, | Performed by: NURSE PRACTITIONER

## 2019-06-19 NOTE — PROGRESS NOTES
"Subjective:       Patient ID: Jerry Durán is a 64 y.o. male who is an established patient of Dr. Garrett though new to me was last seen in this office 6/13/2019    Chief Complaint:   Chief Complaint   Patient presents with    Follow-up     wound check     Per Dr. Garrett:    He reports lump in R scrotum. Saw PCP and  for this previously. At  in 3/19, he had purulent drainage but did not resolve completely after that. Drained spontaneously and I&D not performed. He has noted the "cyst" since then. He has had oozing from the same area since then. Pain resolved.     No GI issues. No prior abscess or skin cysts. Denies voiding problems.     He is here today for a wound check. He was prescribed Bactrim DS x 14 days at his previous visit with Dr. Garrett. He remains on his antibiotics. There has been no further drainage from his wound. Denies pain/tenderness, redness or fever.    ACTIVE MEDICAL ISSUES:  Patient Active Problem List   Diagnosis    Seizure       ALLERGIES AND MEDICATIONS: updated and reviewed.  Review of patient's allergies indicates:  No Known Allergies  Current Outpatient Medications   Medication Sig    PHENobarbital (LUMINAL) 30 MG tablet 1 AM 2 in evening    phenytoin (DILANTIN EXTENDED) 100 MG ER capsule Take 100 tablets by mouth 3 (three) times daily. 1 in AM, 2 in PM    phenytoin (DILANTIN) 30 MG ER capsule Take 30 tablets by mouth once daily.    sulfamethoxazole-trimethoprim 400-80mg (BACTRIM,SEPTRA) 400-80 mg per tablet Take 1 tablet by mouth 2 (two) times daily. for 14 days     No current facility-administered medications for this visit.        Review of Systems   Constitutional: Negative for activity change, chills, fatigue, fever and unexpected weight change.   Eyes: Negative for discharge, redness and visual disturbance.   Respiratory: Negative for cough, shortness of breath and wheezing.    Cardiovascular: Negative for chest pain and leg swelling.   Gastrointestinal: Negative for " abdominal distention, abdominal pain, constipation, diarrhea, nausea and vomiting.   Genitourinary: Negative for decreased urine volume, difficulty urinating, discharge, dysuria, flank pain, frequency, hematuria, penile pain, scrotal swelling, testicular pain and urgency.   Musculoskeletal: Negative for arthralgias, joint swelling and myalgias.   Skin: Positive for wound. Negative for color change and rash.   Neurological: Negative for dizziness and light-headedness.   Psychiatric/Behavioral: Negative for behavioral problems and confusion. The patient is not nervous/anxious.        Objective:      Vitals:    06/19/19 1525   Resp: 16   Weight: 90 kg (198 lb 6.6 oz)   Height: 6' (1.829 m)     Physical Exam   Nursing note and vitals reviewed.  Constitutional: He is oriented to person, place, and time. He appears well-developed.   HENT:   Head: Normocephalic and atraumatic.   Nose: Nose normal.   Eyes: Conjunctivae are normal. Right eye exhibits no discharge. Left eye exhibits no discharge.   Neck: Normal range of motion. Neck supple. No tracheal deviation present. No thyromegaly present.   Cardiovascular: Normal rate and regular rhythm.    Pulmonary/Chest: Effort normal. No stridor. No respiratory distress. He has no wheezes.   Abdominal: Soft. He exhibits no distension. There is no hepatosplenomegaly. There is no tenderness. There is no CVA tenderness. No hernia. Hernia confirmed negative in the right inguinal area and confirmed negative in the left inguinal area.   Genitourinary: Right testis shows no swelling and no tenderness. Left testis shows no swelling and no tenderness. Circumcised. No phimosis, penile erythema or penile tenderness. No discharge found.   Genitourinary Comments: Oval shaped lesion noted to R proximal scrotum (~3cm x 2 cm). No areas of drainage noted. No erythema, fluctuance or crepitus noted   Musculoskeletal: Normal range of motion. He exhibits no edema.   Neurological: He is alert and  oriented to person, place, and time.   Skin: Skin is warm and dry. No rash noted. No erythema.     Psychiatric: He has a normal mood and affect. His behavior is normal. Judgment normal.       Urine dipstick shows patient unable to produce specimen.     Assessment:       1. Scrotal cyst          Plan:       1. Scrotal cyst  -Previously discussed surgical excision with Dr. Garrett. He would still like to hold on this procedure for now.  -Continue Bactrim DS until medication completed  -Again reviewed ssx of worsening infection  -Recheck wound in 3 weeks once antibiotics are completed            Follow up in about 3 weeks (around 7/10/2019) for wound check.

## 2019-07-11 ENCOUNTER — OFFICE VISIT (OUTPATIENT)
Dept: UROLOGY | Facility: CLINIC | Age: 64
End: 2019-07-11
Payer: COMMERCIAL

## 2019-07-11 VITALS
BODY MASS INDEX: 27.36 KG/M2 | DIASTOLIC BLOOD PRESSURE: 72 MMHG | WEIGHT: 202 LBS | HEIGHT: 72 IN | SYSTOLIC BLOOD PRESSURE: 128 MMHG

## 2019-07-11 DIAGNOSIS — L02.214 GROIN ABSCESS: ICD-10-CM

## 2019-07-11 DIAGNOSIS — L72.9 SCROTAL CYST: Primary | ICD-10-CM

## 2019-07-11 PROCEDURE — 3008F PR BODY MASS INDEX (BMI) DOCUMENTED: ICD-10-PCS | Mod: CPTII,S$GLB,, | Performed by: UROLOGY

## 2019-07-11 PROCEDURE — 99999 PR PBB SHADOW E&M-EST. PATIENT-LVL III: ICD-10-PCS | Mod: PBBFAC,,, | Performed by: UROLOGY

## 2019-07-11 PROCEDURE — 99214 OFFICE O/P EST MOD 30 MIN: CPT | Mod: S$GLB,,, | Performed by: UROLOGY

## 2019-07-11 PROCEDURE — 99214 PR OFFICE/OUTPT VISIT, EST, LEVL IV, 30-39 MIN: ICD-10-PCS | Mod: S$GLB,,, | Performed by: UROLOGY

## 2019-07-11 PROCEDURE — 99999 PR PBB SHADOW E&M-EST. PATIENT-LVL III: CPT | Mod: PBBFAC,,, | Performed by: UROLOGY

## 2019-07-11 PROCEDURE — 3008F BODY MASS INDEX DOCD: CPT | Mod: CPTII,S$GLB,, | Performed by: UROLOGY

## 2019-07-11 RX ORDER — SULFAMETHOXAZOLE AND TRIMETHOPRIM 400; 80 MG/1; MG/1
1 TABLET ORAL 2 TIMES DAILY
Qty: 42 TABLET | Refills: 0 | Status: SHIPPED | OUTPATIENT
Start: 2019-07-11 | End: 2019-07-15

## 2019-07-11 NOTE — PROGRESS NOTES
"Subjective:       Jerry Durán is a 64 y.o. male who is a new patient who was referred by Dr Cabrales for evaluation of "scrotal cyst".      He reports lump in R scrotum. Saw PCP and  for this previously. At  in 3/19, he had purulent drainage but did not resolve completely after that. Drained spontaneously and I&D not performed. He has noted the "cyst" since then. He has had oozing from the same area since then. Pain resolved.     No GI issues. No prior abscess or skin cysts. Denies voiding problems.     He was given 2 week trial of Bactrim as he declined surgical intervention. Saw Cindy for wound check and was stable. Here again for wound check. He reports continued intermittent drainage.       The following portions of the patient's history were reviewed and updated as appropriate: allergies, current medications, past family history, past medical history, past social history, past surgical history and problem list.    Review of Systems  Constitutional: no fever or chills  ENT: no nasal congestion or sore throat  Respiratory: no cough or shortness of breath  Cardiovascular: no chest pain or palpitations  Gastrointestinal: no nausea or vomiting, tolerating diet  Genitourinary: as per HPI  Hematologic/Lymphatic: no easy bruising or lymphadenopathy  Musculoskeletal: no arthralgias or myalgias  Skin: no rashes or lesions  Neurological: no seizures or tremors  Behavioral/Psych: no auditory or visual hallucinations       Objective:    Vitals: /72   Ht 6' (1.829 m)   Wt 91.6 kg (202 lb)   BMI 27.40 kg/m²     Physical Exam   General: well developed, well nourished in no acute distress  Head: normocephalic, atraumatic  Neck: supple, trachea midline, no obvious enlargement of thyroid  HEENT: EOMI, mucus membranes moist, sclera anicteric, no hearing impairment  Lungs: symmetric expansion, non-labored breathing  Cardiovascular: regular rate and rhythm, normal pulses  Abdomen: soft, non tender, non distended, no " palpable masses, no hepatosplenomegaly, no hernias, bladder nonpalpable. No CVA tenderness.  Musculoskeletal: no peripheral edema, normal ROM in bilateral upper and lower extremities  Lymphatics: no cervical or inguinal lymphadenopathy  Skin: no rashes or lesions  Neuro: alert and oriented x 3, no gross deficits  Psych: normal judgment and insight, normal mood/affect and non-anxious  Genitourinary:   Penis -  circumcised penis without plaques, lesions, or scarring.  Urethra - orthotopic location without stenosis.  Scrotum  - no lesions or rashes, no hydrocele or hernia.  Testes - descended bilaterally, symmetric without masses, non tender.  Epididymides - no cysts or lesions, no spermatocele, symmetric   Oval shaped area in R proximal lateral scrotum (approx 4cm x 2cm) with 2 areas of white drainage noted. No cellulitis, crepitus. Nontender. Lesion is near cord structures.   ANDREW: patient declined exam      Lab Review   Urine analysis today in clinic shows Negative     No results found for: WBC, HGB, HCT, MCV, PLT  No results found for: CREATININE, BUN  No results found for: PSA  No results found for: PSADIAG    Imaging  NA       Assessment/Plan:      1. Scrotal cyst    - Discussed surgical excision. Does not seem well encapsulated but could try excision of abnormal area to allow closure of wound after. He is apprehensive for a procedure.    - Reviewed possible s/s of worsening infection requiring I&D requiring packing, healing by secondary intention.   - Opted to try another round of abx first prior to surgery. Suspect this will not clear wound completely but okay to try first as wound appears stable.    - Bactrim x 21d   - Will plan for excision 8/13/19 - declines sooner intervention. Discussed risk of recurrence.      2. Groin abscess    - Will monitor. Recommend excision       Follow up in 2 weeks post-op

## 2019-07-15 ENCOUNTER — TELEPHONE (OUTPATIENT)
Dept: UROLOGY | Facility: CLINIC | Age: 64
End: 2019-07-15

## 2019-07-15 RX ORDER — CEPHALEXIN 500 MG/1
500 CAPSULE ORAL EVERY 12 HOURS
Qty: 42 CAPSULE | Refills: 0 | Status: SHIPPED | OUTPATIENT
Start: 2019-07-15 | End: 2019-08-05

## 2019-07-15 NOTE — TELEPHONE ENCOUNTER
Pt states he was given bactrim an on Saturday he broke out in a bad rash. Pt has stopped medication but wanted to know should he be placed on another medication. Pt states he had taken bactrim in the pass an never had a reaction. Please advise-magnolia

## 2019-07-15 NOTE — TELEPHONE ENCOUNTER
----- Message from Roshni Gonzalez sent at 7/15/2019  8:28 AM CDT -----  Contact: Patient  Type: Patient Call Back    Who called: Patient    What is the request in detail: Pt is requesting a call back to discuss his sulfamethoxazole-trimethoprim 400-80mg (BACTRIM,SEPTRA) 400-80 mg per tablet . Pt states that he has had a reaction to the medication wants to be advised.    Can the clinic reply by [facility]? No    Would the patient rather a call back or a response via [facility]? Call back    Best call back number: [phone]    Additional Information:

## 2019-08-06 ENCOUNTER — HOSPITAL ENCOUNTER (OUTPATIENT)
Dept: PREADMISSION TESTING | Facility: HOSPITAL | Age: 64
Discharge: HOME | End: 2019-08-06
Attending: UROLOGY
Payer: COMMERCIAL

## 2019-08-06 VITALS
HEIGHT: 72 IN | BODY MASS INDEX: 27.86 KG/M2 | WEIGHT: 205.69 LBS | DIASTOLIC BLOOD PRESSURE: 87 MMHG | TEMPERATURE: 97 F | RESPIRATION RATE: 17 BRPM | OXYGEN SATURATION: 96 % | HEART RATE: 63 BPM | SYSTOLIC BLOOD PRESSURE: 150 MMHG

## 2019-08-06 DIAGNOSIS — Z01.818 PRE-OP TESTING: Primary | ICD-10-CM

## 2019-08-06 LAB
ANION GAP SERPL CALC-SCNC: 9 MMOL/L (ref 8–16)
BASOPHILS # BLD AUTO: 0.03 K/UL (ref 0–0.2)
BASOPHILS NFR BLD: 0.6 % (ref 0–1.9)
BUN SERPL-MCNC: 16 MG/DL (ref 8–23)
CALCIUM SERPL-MCNC: 9.1 MG/DL (ref 8.7–10.5)
CHLORIDE SERPL-SCNC: 107 MMOL/L (ref 95–110)
CO2 SERPL-SCNC: 26 MMOL/L (ref 23–29)
CREAT SERPL-MCNC: 1.1 MG/DL (ref 0.5–1.4)
DIFFERENTIAL METHOD: NORMAL
EOSINOPHIL # BLD AUTO: 0.1 K/UL (ref 0–0.5)
EOSINOPHIL NFR BLD: 2.2 % (ref 0–8)
ERYTHROCYTE [DISTWIDTH] IN BLOOD BY AUTOMATED COUNT: 13.3 % (ref 11.5–14.5)
EST. GFR  (AFRICAN AMERICAN): >60 ML/MIN/1.73 M^2
EST. GFR  (NON AFRICAN AMERICAN): >60 ML/MIN/1.73 M^2
GLUCOSE SERPL-MCNC: 137 MG/DL (ref 70–110)
HCT VFR BLD AUTO: 43.4 % (ref 40–54)
HGB BLD-MCNC: 14.5 G/DL (ref 14–18)
LYMPHOCYTES # BLD AUTO: 1.4 K/UL (ref 1–4.8)
LYMPHOCYTES NFR BLD: 27.3 % (ref 18–48)
MCH RBC QN AUTO: 30.9 PG (ref 27–31)
MCHC RBC AUTO-ENTMCNC: 33.4 G/DL (ref 32–36)
MCV RBC AUTO: 93 FL (ref 82–98)
MONOCYTES # BLD AUTO: 0.4 K/UL (ref 0.3–1)
MONOCYTES NFR BLD: 7.8 % (ref 4–15)
NEUTROPHILS # BLD AUTO: 3.2 K/UL (ref 1.8–7.7)
NEUTROPHILS NFR BLD: 62.3 % (ref 38–73)
PLATELET # BLD AUTO: 221 K/UL (ref 150–350)
PMV BLD AUTO: 9.9 FL (ref 9.2–12.9)
POTASSIUM SERPL-SCNC: 4.1 MMOL/L (ref 3.5–5.1)
RBC # BLD AUTO: 4.69 M/UL (ref 4.6–6.2)
SODIUM SERPL-SCNC: 142 MMOL/L (ref 136–145)
WBC # BLD AUTO: 5.1 K/UL (ref 3.9–12.7)

## 2019-08-06 PROCEDURE — 93005 ELECTROCARDIOGRAM TRACING: CPT

## 2019-08-06 PROCEDURE — 93010 ELECTROCARDIOGRAM REPORT: CPT | Mod: ,,, | Performed by: INTERNAL MEDICINE

## 2019-08-06 PROCEDURE — 85025 COMPLETE CBC W/AUTO DIFF WBC: CPT

## 2019-08-06 PROCEDURE — 36415 COLL VENOUS BLD VENIPUNCTURE: CPT

## 2019-08-06 PROCEDURE — 93010 EKG 12-LEAD: ICD-10-PCS | Mod: ,,, | Performed by: INTERNAL MEDICINE

## 2019-08-06 PROCEDURE — 80048 BASIC METABOLIC PNL TOTAL CA: CPT

## 2019-08-06 RX ORDER — NAPROXEN SODIUM 220 MG
220 TABLET ORAL DAILY PRN
COMMUNITY
End: 2019-08-27 | Stop reason: ALTCHOICE

## 2019-08-06 RX ORDER — ASCORBIC ACID 500 MG
500 TABLET ORAL DAILY
COMMUNITY

## 2019-08-06 NOTE — DISCHARGE INSTRUCTIONS
Your surgery is scheduled for _Tuesday Aug. 13, 2019__.    Call [phone] between 2 p.m. and 5 p.m. on   Monday__ to find out your arrival time for the day of your surgery.      Please report to SAME DAY SURGERY UNIT on the 2nd FLOOR at _______ a.m.  Use front door entrance. The doors open at 0530 am.      INSTRUCTIONS IMPORTANT!!!  ¨ Do not eat or drink after 12 midnight-including water. OK to brush teeth, no   gum, candy or mints!    ¨ Take only these medicines with a small swallow of water-morning of surgery.  Take Phenabarb and Dilantin with water.        __x__  Prep instructions:    SHOWER     __x__  Please shower using Hibiclens soap the night before AND  the morning of  your surgery/procedure. Do not use Hibiclens on your face or genitals         x      If your surgery is around your belly button (Navel) be sure to wash inside your belly button also. Rinse hibiclens off completely.  __x__  No shaving of procedural area at least 4-5 days before surgery due to  increased risk of skin irritation and/or possible infection.     _x___  No powder, lotions or creams to your body.  _x___  You may wear only deodorant on the day of surgery.  _x___  Please remove all jewelry, including piercings and leave at home.  _x___  No money or valuables needed. Please leave at home.  You may bring your cell phone.    _x___  If going home the same day, arrange for a ride home. You will not be able to drive if Anesthesia was used.  .  _x___  Wear loose fitting clothing. Allow for dressings, bandages.  _x___  Stop Aspirin, Ibuprofen, Motrin and Aleve at least 3-5 days before  surgery, unless otherwise instructed by your doctor, or the nurse.              You MAY use Tylenol/acetaminophen until day of surgery.  _x___  Call MD for temperature above 101 degrees.        _x___ Stop taking any Fish Oil supplement or any Vitamins that contain Vitamin   E at least 5 days prior to surgery.              I have read or had read and explained  to me, and understand the above information.  Additional comments or instructions:Please call   [phone] if you have any questions regarding the instructions above.

## 2019-08-13 ENCOUNTER — ANESTHESIA (OUTPATIENT)
Dept: SURGERY | Facility: HOSPITAL | Age: 64
End: 2019-08-13
Payer: COMMERCIAL

## 2019-08-13 ENCOUNTER — ANESTHESIA EVENT (OUTPATIENT)
Dept: SURGERY | Facility: HOSPITAL | Age: 64
End: 2019-08-13
Payer: COMMERCIAL

## 2019-08-13 ENCOUNTER — HOSPITAL ENCOUNTER (OUTPATIENT)
Facility: HOSPITAL | Age: 64
Discharge: HOME | End: 2019-08-13
Attending: UROLOGY | Admitting: UROLOGY
Payer: COMMERCIAL

## 2019-08-13 VITALS
HEART RATE: 57 BPM | DIASTOLIC BLOOD PRESSURE: 85 MMHG | OXYGEN SATURATION: 98 % | RESPIRATION RATE: 18 BRPM | HEIGHT: 72 IN | BODY MASS INDEX: 27.86 KG/M2 | TEMPERATURE: 98 F | WEIGHT: 205.69 LBS | SYSTOLIC BLOOD PRESSURE: 170 MMHG

## 2019-08-13 DIAGNOSIS — L72.9 SCROTAL CYST: Primary | ICD-10-CM

## 2019-08-13 DIAGNOSIS — L02.214 GROIN ABSCESS: ICD-10-CM

## 2019-08-13 PROCEDURE — 71000015 HC POSTOP RECOV 1ST HR: Performed by: UROLOGY

## 2019-08-13 PROCEDURE — D9220A PRA ANESTHESIA: Mod: ANES,,, | Performed by: ANESTHESIOLOGY

## 2019-08-13 PROCEDURE — 36000706: Performed by: UROLOGY

## 2019-08-13 PROCEDURE — 25000003 PHARM REV CODE 250: Performed by: NURSE ANESTHETIST, CERTIFIED REGISTERED

## 2019-08-13 PROCEDURE — D9220A PRA ANESTHESIA: ICD-10-PCS | Mod: CRNA,,, | Performed by: NURSE ANESTHETIST, CERTIFIED REGISTERED

## 2019-08-13 PROCEDURE — 63600175 PHARM REV CODE 636 W HCPCS: Performed by: NURSE ANESTHETIST, CERTIFIED REGISTERED

## 2019-08-13 PROCEDURE — 37000008 HC ANESTHESIA 1ST 15 MINUTES: Performed by: UROLOGY

## 2019-08-13 PROCEDURE — D9220A PRA ANESTHESIA: Mod: CRNA,,, | Performed by: NURSE ANESTHETIST, CERTIFIED REGISTERED

## 2019-08-13 PROCEDURE — 63600175 PHARM REV CODE 636 W HCPCS: Performed by: UROLOGY

## 2019-08-13 PROCEDURE — D9220A PRA ANESTHESIA: ICD-10-PCS | Mod: ANES,,, | Performed by: ANESTHESIOLOGY

## 2019-08-13 PROCEDURE — 71000033 HC RECOVERY, INTIAL HOUR: Performed by: UROLOGY

## 2019-08-13 PROCEDURE — 11426 EXC H-F-NK-SP B9+MARG >4 CM: CPT | Mod: ,,, | Performed by: UROLOGY

## 2019-08-13 PROCEDURE — 27200651 HC AIRWAY, LMA: Performed by: NURSE ANESTHETIST, CERTIFIED REGISTERED

## 2019-08-13 PROCEDURE — 36000707: Performed by: UROLOGY

## 2019-08-13 PROCEDURE — 88305 TISSUE EXAM BY PATHOLOGIST: CPT | Performed by: PATHOLOGY

## 2019-08-13 PROCEDURE — 37000009 HC ANESTHESIA EA ADD 15 MINS: Performed by: UROLOGY

## 2019-08-13 PROCEDURE — 88305 TISSUE EXAM BY PATHOLOGIST: CPT | Mod: 26,,, | Performed by: PATHOLOGY

## 2019-08-13 PROCEDURE — 63600175 PHARM REV CODE 636 W HCPCS: Performed by: ANESTHESIOLOGY

## 2019-08-13 PROCEDURE — 11426 PR EXC SKIN BENIG >4 CM REMAINDR BODY: ICD-10-PCS | Mod: ,,, | Performed by: UROLOGY

## 2019-08-13 PROCEDURE — 88305 TISSUE SPECIMEN TO PATHOLOGY - SURGERY: ICD-10-PCS | Mod: 26,,, | Performed by: PATHOLOGY

## 2019-08-13 RX ORDER — PHENYLEPHRINE HYDROCHLORIDE 10 MG/ML
INJECTION INTRAVENOUS
Status: DISCONTINUED | OUTPATIENT
Start: 2019-08-13 | End: 2019-08-13

## 2019-08-13 RX ORDER — LIDOCAINE HCL/PF 100 MG/5ML
SYRINGE (ML) INTRAVENOUS
Status: DISCONTINUED | OUTPATIENT
Start: 2019-08-13 | End: 2019-08-13

## 2019-08-13 RX ORDER — HYDROCODONE BITARTRATE AND ACETAMINOPHEN 5; 325 MG/1; MG/1
1 TABLET ORAL EVERY 4 HOURS PRN
Status: CANCELLED | OUTPATIENT
Start: 2019-08-13

## 2019-08-13 RX ORDER — CEFAZOLIN SODIUM 2 G/50ML
2 SOLUTION INTRAVENOUS
Status: COMPLETED | OUTPATIENT
Start: 2019-08-13 | End: 2019-08-13

## 2019-08-13 RX ORDER — HYDROCODONE BITARTRATE AND ACETAMINOPHEN 5; 325 MG/1; MG/1
1 TABLET ORAL EVERY 6 HOURS PRN
Qty: 8 TABLET | Refills: 0 | Status: SHIPPED | OUTPATIENT
Start: 2019-08-13 | End: 2019-08-16 | Stop reason: SDUPTHER

## 2019-08-13 RX ORDER — PROPOFOL 10 MG/ML
VIAL (ML) INTRAVENOUS
Status: DISCONTINUED | OUTPATIENT
Start: 2019-08-13 | End: 2019-08-13

## 2019-08-13 RX ORDER — ONDANSETRON 2 MG/ML
INJECTION INTRAMUSCULAR; INTRAVENOUS
Status: DISCONTINUED | OUTPATIENT
Start: 2019-08-13 | End: 2019-08-13

## 2019-08-13 RX ORDER — ACETAMINOPHEN 325 MG/1
650 TABLET ORAL EVERY 4 HOURS PRN
Status: CANCELLED | OUTPATIENT
Start: 2019-08-13

## 2019-08-13 RX ORDER — METOCLOPRAMIDE HYDROCHLORIDE 5 MG/ML
INJECTION INTRAMUSCULAR; INTRAVENOUS
Status: DISCONTINUED | OUTPATIENT
Start: 2019-08-13 | End: 2019-08-13

## 2019-08-13 RX ORDER — HYDROMORPHONE HYDROCHLORIDE 2 MG/ML
0.2 INJECTION, SOLUTION INTRAMUSCULAR; INTRAVENOUS; SUBCUTANEOUS EVERY 5 MIN PRN
Status: DISCONTINUED | OUTPATIENT
Start: 2019-08-13 | End: 2019-08-13 | Stop reason: HOSPADM

## 2019-08-13 RX ORDER — GLYCOPYRROLATE 0.2 MG/ML
INJECTION INTRAMUSCULAR; INTRAVENOUS
Status: DISCONTINUED | OUTPATIENT
Start: 2019-08-13 | End: 2019-08-13

## 2019-08-13 RX ORDER — ONDANSETRON 2 MG/ML
4 INJECTION INTRAMUSCULAR; INTRAVENOUS EVERY 12 HOURS PRN
Status: CANCELLED | OUTPATIENT
Start: 2019-08-13

## 2019-08-13 RX ORDER — FENTANYL CITRATE 50 UG/ML
INJECTION, SOLUTION INTRAMUSCULAR; INTRAVENOUS
Status: DISCONTINUED | OUTPATIENT
Start: 2019-08-13 | End: 2019-08-13

## 2019-08-13 RX ORDER — SODIUM CHLORIDE 0.9 % (FLUSH) 0.9 %
10 SYRINGE (ML) INJECTION
Status: DISCONTINUED | OUTPATIENT
Start: 2019-08-13 | End: 2019-08-13 | Stop reason: HOSPADM

## 2019-08-13 RX ORDER — SODIUM CHLORIDE, SODIUM LACTATE, POTASSIUM CHLORIDE, CALCIUM CHLORIDE 600; 310; 30; 20 MG/100ML; MG/100ML; MG/100ML; MG/100ML
INJECTION, SOLUTION INTRAVENOUS CONTINUOUS
Status: DISCONTINUED | OUTPATIENT
Start: 2019-08-13 | End: 2019-08-13 | Stop reason: HOSPADM

## 2019-08-13 RX ORDER — MIDAZOLAM HYDROCHLORIDE 1 MG/ML
INJECTION, SOLUTION INTRAMUSCULAR; INTRAVENOUS
Status: DISCONTINUED | OUTPATIENT
Start: 2019-08-13 | End: 2019-08-13

## 2019-08-13 RX ADMIN — SODIUM CHLORIDE, SODIUM LACTATE, POTASSIUM CHLORIDE, AND CALCIUM CHLORIDE: .6; .31; .03; .02 INJECTION, SOLUTION INTRAVENOUS at 06:08

## 2019-08-13 RX ADMIN — PHENYLEPHRINE HYDROCHLORIDE 200 MCG: 10 INJECTION INTRAVENOUS at 07:08

## 2019-08-13 RX ADMIN — PROPOFOL 150 MG: 10 INJECTION, EMULSION INTRAVENOUS at 07:08

## 2019-08-13 RX ADMIN — CEFAZOLIN SODIUM 2 G: 2 SOLUTION INTRAVENOUS at 07:08

## 2019-08-13 RX ADMIN — PROPOFOL 30 MG: 10 INJECTION, EMULSION INTRAVENOUS at 07:08

## 2019-08-13 RX ADMIN — ONDANSETRON 4 MG: 2 INJECTION, SOLUTION INTRAMUSCULAR; INTRAVENOUS at 07:08

## 2019-08-13 RX ADMIN — FENTANYL CITRATE 100 MCG: 50 INJECTION, SOLUTION INTRAMUSCULAR; INTRAVENOUS at 07:08

## 2019-08-13 RX ADMIN — GLYCOPYRROLATE 0.1 MG: 0.2 INJECTION, SOLUTION INTRAMUSCULAR; INTRAVENOUS at 07:08

## 2019-08-13 RX ADMIN — MIDAZOLAM HYDROCHLORIDE 2 MG: 1 INJECTION, SOLUTION INTRAMUSCULAR; INTRAVENOUS at 07:08

## 2019-08-13 RX ADMIN — METOCLOPRAMIDE 10 MG: 5 INJECTION, SOLUTION INTRAMUSCULAR; INTRAVENOUS at 07:08

## 2019-08-13 RX ADMIN — LIDOCAINE HYDROCHLORIDE 100 MG: 20 INJECTION, SOLUTION INTRAVENOUS at 07:08

## 2019-08-13 NOTE — ANESTHESIA PREPROCEDURE EVALUATION
08/13/2019    Pre-operative evaluation for Procedure(s) (LRB):  EXCISION, LESION, SCROTUM (N/A)    Jerry Durán is a 64 y.o. male     Patient Active Problem List   Diagnosis    Seizure    Scrotal cyst       Review of patient's allergies indicates:  No Known Allergies    No current facility-administered medications on file prior to encounter.      Current Outpatient Medications on File Prior to Encounter   Medication Sig Dispense Refill    PHENobarbital (LUMINAL) 30 MG tablet Take 30 mg by mouth every morning. 1 AM 2 in evening       phenytoin (DILANTIN EXTENDED) 100 MG ER capsule Take 100 tablets by mouth 2 (two) times daily. 1 in AM, 2 in PM      phenytoin (DILANTIN) 30 MG ER capsule Take 30 tablets by mouth every morning.          Past Surgical History:   Procedure Laterality Date    KNEE SURGERY  1990    Lt knee scope     VITALS    Vitals:    08/13/19 0617   BP: (!) 148/87   Pulse:    Resp:    Temp:        Anesthesia Evaluation    I have reviewed the Patient Summary Reports.     I have reviewed the Medications.     Review of Systems  Anesthesia Hx:  No problems with previous Anesthesia  History of prior surgery of interest to airway management or planning: Denies Family Hx of Anesthesia complications.   Denies Personal Hx of Anesthesia complications.   Social:  Non-Smoker    Hematology/Oncology:  Hematology Normal   Oncology Normal     EENT/Dental:EENT/Dental Normal   Cardiovascular:  Cardiovascular Normal Exercise tolerance: good     Pulmonary:  Pulmonary Normal    Renal/:  Renal/ Normal     Hepatic/GI:  Hepatic/GI Normal    Neurological:   Seizures    Endocrine:  Endocrine Normal    Psych:  Psychiatric Normal           Physical Exam  General:  Well nourished    Airway/Jaw/Neck:  Airway Findings: Mouth Opening: Normal Tongue: Normal  General Airway Assessment: Adult  Mallampati: II  Jaw/Neck Findings:  Neck ROM: Normal ROM      Dental:  Dental Findings: Periodontal disease, Mild    Chest/Lungs:  Chest/Lungs Findings: Normal Respiratory Rate     Heart/Vascular:  Heart Findings: Rate: Normal  Rhythm: Regular Rhythm        Mental Status:  Mental Status Findings:  Cooperative, Alert and Oriented         Anesthesia Plan  Type of Anesthesia, risks & benefits discussed:  Anesthesia Type:  general  Patient's Preference:   Intra-op Monitoring Plan: standard ASA monitors  Intra-op Monitoring Plan Comments:   Post Op Pain Control Plan: per primary service following discharge from PACU  Post Op Pain Control Plan Comments:   Induction:   IV  Beta Blocker:  Patient is not currently on a Beta-Blocker (No further documentation required).       Informed Consent: Patient understands risks and agrees with Anesthesia plan.  Questions answered.   ASA Score: 2     Day of Surgery Review of History & Physical: I have interviewed and examined the patient. I have reviewed the patient's H&P dated:  There are no significant changes.          Ready For Surgery From Anesthesia Perspective.

## 2019-08-13 NOTE — H&P
"Subjective:       Jerry Durán is a 64 y.o. male who is a new patient who was referred by Dr Cabrales for evaluation of "scrotal cyst".      He reports lump in R scrotum. Saw PCP and  for this previously. At  in 3/19, he had purulent drainage but did not resolve completely after that. Drained spontaneously and I&D not performed. He has noted the "cyst" since then. He has had oozing from the same area since then. Pain resolved.     No GI issues. No prior abscess or skin cysts. Denies voiding problems.     He was given 2 week trial of Bactrim as he declined surgical intervention. Saw Cindy for wound check and was stable. Here again for wound check. He reports continued intermittent drainage.       The following portions of the patient's history were reviewed and updated as appropriate: allergies, current medications, past family history, past medical history, past social history, past surgical history and problem list.    Review of Systems  Constitutional: no fever or chills  ENT: no nasal congestion or sore throat  Respiratory: no cough or shortness of breath  Cardiovascular: no chest pain or palpitations  Gastrointestinal: no nausea or vomiting, tolerating diet  Genitourinary: as per HPI  Hematologic/Lymphatic: no easy bruising or lymphadenopathy  Musculoskeletal: no arthralgias or myalgias  Skin: no rashes or lesions  Neurological: no seizures or tremors  Behavioral/Psych: no auditory or visual hallucinations       Objective:    Vitals: BP (!) 148/87   Pulse 68   Temp 98.1 °F (36.7 °C) (Oral)   Resp 18   Ht 6' (1.829 m)   Wt 93.3 kg (205 lb 11 oz)   SpO2 97%   BMI 27.90 kg/m²     Physical Exam   General: well developed, well nourished in no acute distress  Head: normocephalic, atraumatic  Neck: supple, trachea midline, no obvious enlargement of thyroid  HEENT: EOMI, mucus membranes moist, sclera anicteric, no hearing impairment  Lungs: symmetric expansion, non-labored breathing  Cardiovascular: regular " rate and rhythm, normal pulses  Abdomen: soft, non tender, non distended, no palpable masses, no hepatosplenomegaly, no hernias, bladder nonpalpable. No CVA tenderness.  Musculoskeletal: no peripheral edema, normal ROM in bilateral upper and lower extremities  Lymphatics: no cervical or inguinal lymphadenopathy  Skin: no rashes or lesions  Neuro: alert and oriented x 3, no gross deficits  Psych: normal judgment and insight, normal mood/affect and non-anxious  Genitourinary:   Penis -  circumcised penis without plaques, lesions, or scarring.  Urethra - orthotopic location without stenosis.  Scrotum  - no lesions or rashes, no hydrocele or hernia.  Testes - descended bilaterally, symmetric without masses, non tender.  Epididymides - no cysts or lesions, no spermatocele, symmetric   Oval shaped area in R proximal lateral scrotum (approx 4cm x 2cm) with 2 areas of white drainage noted. No cellulitis, crepitus. Nontender. Lesion is near cord structures.   ANDREW: patient declined exam      Lab Review   Urine analysis today in clinic shows Negative     Lab Results   Component Value Date    WBC 5.10 08/06/2019    HGB 14.5 08/06/2019    HCT 43.4 08/06/2019    MCV 93 08/06/2019     08/06/2019     Lab Results   Component Value Date    CREATININE 1.1 08/06/2019    BUN 16 08/06/2019     No results found for: PSA  No results found for: PSADIAG    Imaging  NA       Assessment/Plan:      1. Scrotal cyst    - Discussed surgical excision. Does not seem well encapsulated but could try excision of abnormal area to allow closure of wound after. He is apprehensive for a procedure.    - Reviewed possible s/s of worsening infection requiring I&D requiring packing, healing by secondary intention.   - Opted to try another round of abx first prior to surgery. Suspect this will not clear wound completely but okay to try first as wound appears stable.    - Bactrim x 21d   - Will plan for excision 8/13/19 - declines sooner intervention.  Discussed risk of recurrence.      2. Groin abscess    - Will monitor. Recommend excision       Follow up in 2 weeks post-op

## 2019-08-13 NOTE — TRANSFER OF CARE
Anesthesia Transfer of Care Note    Patient: Jerry Durán    Procedure(s) Performed: Procedure(s) (LRB):  EXCISION, LESION, SCROTUM (Right)    Patient location: PACU    Anesthesia Type: general    Transport from OR: Transported from OR on room air with adequate spontaneous ventilation    Post pain: adequate analgesia    Post assessment: no apparent anesthetic complications and tolerated procedure well    Post vital signs: stable    Level of consciousness: awake, alert and oriented    Nausea/Vomiting: no nausea/vomiting    Complications: none    Transfer of care protocol was followed      Last vitals:   Visit Vitals  BP (!) 136/94   Pulse 66   Temp 36.4 °C (97.5 °F) (Oral)   Resp 16   Ht 6' (1.829 m)   Wt 93.3 kg (205 lb 11 oz)   SpO2 (!) 94%   BMI 27.90 kg/m²

## 2019-08-13 NOTE — DISCHARGE INSTRUCTIONS
DIET: You may resume your home diet. If nausea is present, increase your diet gradually with fluids and bland foods    ACTIVITY LEVEL: You have received sedation or an anesthetic, you may feel sleepy for several hours. Rest until you are more awake. Gradually resume your normal activities    Medications: Pain medication should be taken only if needed and as directed. If antibiotics are prescribed, the medication should be taken until completed. You will be given an updated list of you medications.    No driving, alcoholic beverages or signing legal documents for next 24 hours or while taking pain medication.       CALL THE DOCTOR:    For any obvious bleeding (some dried blood over the incision is normal).      Redness, swelling, foul smell around incision or fever over 101.   Shortness of breath, Coughing up Bloody sputum, Pains or Swelling in your Calves .   Persistent pain or nausea not relieved by medication.    If any unusual problems or difficulties occur contact your doctor. If you cannot contact your doctor but feel your signs and symptoms warrant a physicians attention return to the emergency room.        Fall Prevention  Millions of people fall every year and injure themselves. You may have had anesthesia or sedation which may increase your risk of falling. You may have health issues that put you at an increased risk of falling.     Here are ways to reduce your risk of falling.  ·   · Make your home safe by keeping walkways clear of objects you may trip over.  · Use non-slip pads under rugs. Do not use area rugs or small throw rugs.  · Use non-slip mats in bathtubs and showers.  · Install handrails and lights on staircases.  · Do not walk in poorly lit areas.  · Do not stand on chairs or wobbly ladders.  · Use caution when reaching overhead or looking upward. This position can cause a loss of balance.  · Be sure your shoes fit properly, have non-slip bottoms and are in good condition.   · Wear shoes  both inside and out. Avoid going barefoot or wearing slippers.  · Be cautious when going up and down stairs, curbs, and when walking on uneven sidewalks.  · If your balance is poor, consider using a cane or walker.  · If your fall was related to alcohol use, stop or limit alcohol intake.   · If your fall was related to use of sleeping medicines, talk to your doctor about this. You may need to reduce your dosage at bedtime if you awaken during the night to go to the bathroom.    · To reduce the need for nighttime bathroom trips:  ¨ Avoid drinking fluids for several hours before going to bed  ¨ Empty your bladder before going to bed  ¨ Men can keep a urinal at the bedside  · Stay as active as you can. Balance, flexibility, strength, and endurance all come from exercise. They all play a role in preventing falls. Ask your healthcare provider which types of activity are right for you.  · Get your vision checked on a regular basis.  · If you have pets, know where they are before you stand up or walk so you don't trip over them.  · Use night lights.

## 2019-08-13 NOTE — OP NOTE
[facility]  Surgery Department  Urology Operative Note    SUMMARY     Date of Procedure: 8/13/2019     Surgeon(s) and Role:     * Pat Garrett MD - Primary    Assisting Surgeon: None    Pre-Operative Diagnosis: Scrotal cyst [L72.9]  Groin abscess [L02.214]    Post-Operative Diagnosis: Post-Op Diagnosis Codes:     * Scrotal cyst [L72.9]     * Groin abscess [L02.214]    Procedure: Procedure(s) (LRB):  Excision of scrotal lesion    Anesthesia: Choice    Indication for Procedure:  64-year-old male with previous scrotal abscess that has not completely resolved since 03/2019.  He has had 2 areas of drainage at this area of the right proximal scrotum/inguinal area.  He presents today for excision of this.  He understands risks of recurrence.    Description of Procedure:  Patient was brought to the operating room and placed under general LMA anesthesia.  Full time-out procedures were performed identifying the correct patient and procedure.  Appropriate IV antibiotics with Ancef was given prior commencement of surgery.  The patient was placed in a frog-leg position on the operating room table and was prepped and draped in usual sterile fashion.    The abnormal area in the right hemiscrotum was identified with 2 areas of purulent drainage in the superior and inferior aspect.  The abnormal area was then an ovoid shaped approximately 2 cm x 5 cm.  An ellipsoid incision was made with a 15 blade scalpel around the abnormal area and Bovie cautery was used to dissect the abnormal tissue free.  Care was taken to avoid cord structures.  Tunica vaginalis was not entered.  The area of induration was completely excised at the conclusion of the procedure.  The bed of the excised tissue was carefully inspected and was noted to be normal with good blood supply to the skin edges.  Hemostasis was obtained.    Two 0 Vicryl was used to reapproximate the skin edges with deep dermal sutures.  The skin was closed with  a 2 0 chromic in a running a horizontal mattress fashion. Dermabond was placed over the incision.    The patient was then awakened from general anesthesia and transferred to the PACU in a stable condition.    All counts were correct x2 with completion of the procedure.    Findings:  Ovoid shaped chronic scrotal skin infection completely excised and skin edges closed.      Complications: No    Estimated Blood Loss (EBL): 5cc    Drains: none           Implants: * No implants in log *    Specimens:   Specimen (12h ago, onward)    Start     Ordered    08/13/19 0752  Specimen to Pathology - Surgery  Once     Comments:  Scrotal Lesion     Start Status     08/13/19 0752 Collected (08/13/19 0748) Order ID: [id number]       08/13/19 0751                  Condition: Good    Disposition: PACU - hemodynamically stable.    Attestation: I was present and scrubbed for the entire procedure.    Discharge Note    SUMMARY     Admit Date: 8/13/2019    Discharge Date and Time:  08/13/2019 8:25 AM    Hospital Course (synopsis of major diagnoses, care, treatment, and services provided during the course of the hospital stay):  Uncomplicated excision of scrotal lesion     Final Diagnosis: Post-Op Diagnosis Codes:     * Scrotal cyst [L72.9]     * Groin abscess [L02.214]    Disposition: Home or Self Care    Follow Up/Patient Instructions:     Medications:  Reconciled Home Medications:      Medication List      START taking these medications    HYDROcodone-acetaminophen 5-325 mg per tablet  Commonly known as:  NORCO  Take 1 tablet by mouth every 6 (six) hours as needed for Pain.        CONTINUE taking these medications    * DILANTIN EXTENDED 100 MG ER capsule  Generic drug:  phenytoin  Take 100 tablets by mouth 2 (two) times daily. 1 in AM, 2 in PM     * DILANTIN 30 MG ER capsule  Generic drug:  phenytoin  Take 30 tablets by mouth every morning.     naproxen sodium 220 MG tablet  Commonly known as:  ANAPROX  Take 220 mg by mouth daily as  needed.     PHENobarbital 30 MG tablet  Commonly known as:  LUMINAL  Take 30 mg by mouth every morning. 1 AM 2 in evening     VITAMIN C 500 MG tablet  Generic drug:  ascorbic acid (vitamin C)  Take 500 mg by mouth once daily.         * This list has 2 medication(s) that are the same as other medications prescribed for you. Read the directions carefully, and ask your doctor or other care provider to review them with you.              Discharge Procedure Orders   Diet general     Call MD for:  temperature >100.4     Call MD for:  persistent nausea and vomiting     Call MD for:  severe uncontrolled pain     Call MD for:  difficulty breathing, headache or visual disturbances     No dressing needed     Lifting restrictions   Order Comments: Light activity for 2 weeks.     Call MD for:  redness, tenderness, or signs of infection (pain, swelling, redness, odor or green/yellow discharge around incision site)     Shower on day dressing removed (No bath)   Order Comments: Okay to shower tomorrow. Do not soak/bathe for 2 weeks.     Follow-up Information     Pat Garrett MD In 2 weeks.    Specialty:  Urology  Why:  For post-op follow up  Contact information:  [address]  [address]  [facility] [id number] [phone]

## 2019-08-13 NOTE — PLAN OF CARE
Problem: Pain (Surgery Nonspecified)  Goal: Acceptable Pain Control  Outcome: Ongoing (interventions implemented as appropriate)  Denies pain thoughout pacu stay. 0/10 on scale. Ready for transfer to [facility].

## 2019-08-14 NOTE — ANESTHESIA POSTPROCEDURE EVALUATION
Anesthesia Post Evaluation    Patient: Angel Durán Jr.    Procedure(s) Performed: Procedure(s) (LRB):  EXCISION, LESION, SCROTUM (Right)    Final Anesthesia Type: general  Patient location during evaluation: PACU  Patient participation: Yes- Able to Participate  Level of consciousness: awake and alert and oriented  Post-procedure vital signs: reviewed and stable  Pain management: adequate  Airway patency: patent  PONV status at discharge: No PONV  Anesthetic complications: no      Cardiovascular status: blood pressure returned to baseline, hemodynamically stable and stable  Respiratory status: unassisted, spontaneous ventilation and room air  Hydration status: euvolemic  Follow-up not needed.          Vitals Value Taken Time   /85 8/13/2019  8:51 AM   Temp 36.4 °C (97.6 °F) 8/13/2019  8:51 AM   Pulse 57 8/13/2019  8:51 AM   Resp 18 8/13/2019  8:51 AM   SpO2 98 % 8/13/2019  8:51 AM         Event Time     Out of Recovery 08:50:36          Pain/Jimbo Score: Pain Rating Prior to Med Admin: 0 (8/13/2019  8:45 AM)  Pain Rating Post Med Admin: 0 (8/13/2019  8:45 AM)  Jimbo Score: 10 (8/13/2019  8:51 AM)  Modified Jimbo Score: 20 (8/13/2019  9:40 AM)

## 2019-08-16 NOTE — TELEPHONE ENCOUNTER
----- Message from Alex Aragon sent at 8/16/2019  3:08 PM CDT -----  Contact: Self  Type: Patient Call Back    Who called:Self    What is the request in detail: patient still has swelling from Monday's surgery and would like to know what to do    Can the clinic reply by [facility]? no    Would the patient rather a call back or a response via [facility]? call  Best call back number: [phone]

## 2019-08-16 NOTE — TELEPHONE ENCOUNTER
Spoke to patient who states he is swollen from Mondays surgery.. I explained to him that swelling is normal because of site and type of surgery to do ice pack compress as needed for swelling.. He asked about pain medication stating he only has 4 pills left.. B.N.

## 2019-08-17 RX ORDER — HYDROCODONE BITARTRATE AND ACETAMINOPHEN 5; 325 MG/1; MG/1
1 TABLET ORAL EVERY 6 HOURS PRN
Qty: 8 TABLET | Refills: 0 | Status: SHIPPED | OUTPATIENT
Start: 2019-08-17 | End: 2019-08-17 | Stop reason: SDUPTHER

## 2019-08-17 RX ORDER — HYDROCODONE BITARTRATE AND ACETAMINOPHEN 5; 325 MG/1; MG/1
1 TABLET ORAL EVERY 6 HOURS PRN
Qty: 8 TABLET | Refills: 0 | Status: SHIPPED | OUTPATIENT
Start: 2019-08-17 | End: 2019-09-17 | Stop reason: ALTCHOICE

## 2019-08-20 NOTE — TELEPHONE ENCOUNTER
Patient was informed of medication at the pharmacy and he also had a question that was answered .. B.N.

## 2019-08-22 RX ORDER — HYDROCODONE BITARTRATE AND ACETAMINOPHEN 5; 325 MG/1; MG/1
1 TABLET ORAL EVERY 6 HOURS PRN
Qty: 8 TABLET | Refills: 0 | OUTPATIENT
Start: 2019-08-22

## 2019-08-22 NOTE — TELEPHONE ENCOUNTER
Pt is now 1.5 weeks out from procedure. He should not be requiring further narcotics. He should take ibuprofen or Tylenol for pain.

## 2019-08-22 NOTE — TELEPHONE ENCOUNTER
"----- Message from Alex Paganaux sent at 8/22/2019  4:24 PM CDT -----  Contact: Self  Type: RX Refill Request    Who Called: self    Refill or New Rx: refill    RX Name and Strength HYDROcodone-acetaminophen (NORCO) 5-325 mg per tablet      Preferred Pharmacy with phone number: [id number] - [place], [place] - [address] [facility] [phone] (Phone)  [phone] (Fax)        Local or Mail Order: local  Ordering Provider: Dr. Garrett    Would the patient rather a call back or a response via [facility]? call    Best Call Back Number: [phone]    Additional Information: Please add "more than seven day supply of opioid is medically necessary" to script for patient to get remainder of medication  "

## 2019-08-27 ENCOUNTER — TELEPHONE (OUTPATIENT)
Dept: UROLOGY | Facility: CLINIC | Age: 64
End: 2019-08-27

## 2019-08-27 ENCOUNTER — OFFICE VISIT (OUTPATIENT)
Dept: URGENT CARE | Facility: CLINIC | Age: 64
End: 2019-08-27
Payer: COMMERCIAL

## 2019-08-27 VITALS
WEIGHT: 195 LBS | HEIGHT: 72 IN | SYSTOLIC BLOOD PRESSURE: 147 MMHG | HEART RATE: 98 BPM | TEMPERATURE: 99 F | DIASTOLIC BLOOD PRESSURE: 89 MMHG | OXYGEN SATURATION: 95 % | BODY MASS INDEX: 26.41 KG/M2

## 2019-08-27 DIAGNOSIS — R50.9 FEVER, UNSPECIFIED FEVER CAUSE: ICD-10-CM

## 2019-08-27 DIAGNOSIS — B34.9 VIRAL SYNDROME: Primary | ICD-10-CM

## 2019-08-27 LAB
CTP QC/QA: YES
FLUAV AG NPH QL: NEGATIVE
FLUBV AG NPH QL: NEGATIVE

## 2019-08-27 PROCEDURE — 99214 PR OFFICE/OUTPT VISIT, EST, LEVL IV, 30-39 MIN: ICD-10-PCS | Mod: 25,S$GLB,, | Performed by: PHYSICIAN ASSISTANT

## 2019-08-27 PROCEDURE — 96372 PR INJECTION,THERAP/PROPH/DIAG2ST, IM OR SUBCUT: ICD-10-PCS | Mod: S$GLB,,, | Performed by: FAMILY MEDICINE

## 2019-08-27 PROCEDURE — 3008F PR BODY MASS INDEX (BMI) DOCUMENTED: ICD-10-PCS | Mod: CPTII,S$GLB,, | Performed by: PHYSICIAN ASSISTANT

## 2019-08-27 PROCEDURE — 99214 OFFICE O/P EST MOD 30 MIN: CPT | Mod: 25,S$GLB,, | Performed by: PHYSICIAN ASSISTANT

## 2019-08-27 PROCEDURE — 87804 INFLUENZA ASSAY W/OPTIC: CPT | Mod: QW,S$GLB,, | Performed by: PHYSICIAN ASSISTANT

## 2019-08-27 PROCEDURE — 87804 POCT INFLUENZA A/B: ICD-10-PCS | Mod: QW,S$GLB,, | Performed by: PHYSICIAN ASSISTANT

## 2019-08-27 PROCEDURE — 96372 THER/PROPH/DIAG INJ SC/IM: CPT | Mod: S$GLB,,, | Performed by: FAMILY MEDICINE

## 2019-08-27 PROCEDURE — 3008F BODY MASS INDEX DOCD: CPT | Mod: CPTII,S$GLB,, | Performed by: PHYSICIAN ASSISTANT

## 2019-08-27 RX ORDER — IBUPROFEN 800 MG/1
800 TABLET ORAL EVERY 6 HOURS PRN
Qty: 30 TABLET | Refills: 0 | Status: SHIPPED | OUTPATIENT
Start: 2019-08-27 | End: 2019-09-17 | Stop reason: ALTCHOICE

## 2019-08-27 RX ORDER — KETOROLAC TROMETHAMINE 30 MG/ML
30 INJECTION, SOLUTION INTRAMUSCULAR; INTRAVENOUS
Status: COMPLETED | OUTPATIENT
Start: 2019-08-27 | End: 2019-08-27

## 2019-08-27 RX ADMIN — KETOROLAC TROMETHAMINE 30 MG: 30 INJECTION, SOLUTION INTRAMUSCULAR; INTRAVENOUS at 06:08

## 2019-08-27 NOTE — PROGRESS NOTES
"Subjective:       Patient ID: Angel Durán Jr. is a 64 y.o. male.    Vitals:  height is 6' (1.829 m) and weight is 88.5 kg (195 lb). His temperature is 99.3 °F (37.4 °C). His blood pressure is 147/89 (abnormal) and his pulse is 98. His oxygen saturation is 95%.     Chief Complaint: Headache    Mr. Durán presents for evaluation of fever & headache x 2 days.  His wife states that he has "laid around" for the past 2 days, which is not like him.  He denies any current sore throat, congestion, cough, rhinorrhea, N/V/D, neck pain, blurry vision, weakness or paresthesias.  He has tried tylenol & cold & flu medication.     Fever    This is a new problem. The current episode started in the past 7 days. The problem occurs constantly. The problem has been unchanged. The maximum temperature noted was 100 to 100.9 F. The temperature was taken using an oral thermometer. Associated symptoms include coughing and headaches. Pertinent negatives include no congestion, nausea, rash or vomiting. He has tried acetaminophen for the symptoms. The treatment provided mild relief.       Constitution: Positive for fever. Negative for chills and sweating.   HENT: Negative for tinnitus, facial swelling, congestion and sinus pain.    Neck: Negative for neck pain and neck stiffness.   Eyes: Negative for eye pain, photophobia, vision loss, double vision and blurred vision.   Respiratory: Positive for cough.    Gastrointestinal: Negative for nausea and vomiting.   Musculoskeletal: Negative for trauma and muscle ache.   Skin: Negative for rash, wound and lesion.   Neurological: Positive for headaches. Negative for dizziness, history of vertigo, light-headedness, facial drooping, speech difficulty, coordination disturbances, loss of balance, history of migraines, disorientation and loss of consciousness.   Psychiatric/Behavioral: Negative for disorientation, confusion, nervous/anxious, sleep disturbance and depression. The patient is not " nervous/anxious.        Objective:      Physical Exam   Constitutional: He is oriented to person, place, and time. He appears well-developed and well-nourished. He is cooperative.  Non-toxic appearance. He does not appear ill. No distress.   HENT:   Head: Normocephalic and atraumatic.   Right Ear: Hearing, tympanic membrane, external ear and ear canal normal.   Left Ear: Hearing, tympanic membrane, external ear and ear canal normal.   Nose: Nose normal. No mucosal edema, rhinorrhea or nasal deformity. No epistaxis. Right sinus exhibits no maxillary sinus tenderness and no frontal sinus tenderness. Left sinus exhibits no maxillary sinus tenderness and no frontal sinus tenderness.   Mouth/Throat: Uvula is midline, oropharynx is clear and moist and mucous membranes are normal. No trismus in the jaw. Normal dentition. No uvula swelling. No posterior oropharyngeal erythema.   No temporal TTP   Eyes: Pupils are equal, round, and reactive to light. Conjunctivae, EOM and lids are normal. No scleral icterus.   Sclera clear bilat   Neck: Trachea normal, normal range of motion, full passive range of motion without pain and phonation normal. Neck supple. No neck rigidity.   Cardiovascular: Normal rate, regular rhythm, normal heart sounds, intact distal pulses and normal pulses.   Pulmonary/Chest: Effort normal and breath sounds normal. No respiratory distress. He has no decreased breath sounds. He has no wheezes. He has no rhonchi. He has no rales.   Abdominal: Soft. Normal appearance and bowel sounds are normal. He exhibits no distension. There is no tenderness.   Musculoskeletal: Normal range of motion. He exhibits no edema or deformity.   Neurological: He is alert and oriented to person, place, and time. He has normal strength. No cranial nerve deficit or sensory deficit. He exhibits normal muscle tone. He displays no seizure activity. Coordination and gait normal. GCS eye subscore is 4. GCS verbal subscore is 5. GCS motor  subscore is 6.   Skin: Skin is warm, dry and intact. He is not diaphoretic. No pallor.   Psychiatric: He has a normal mood and affect. His speech is normal and behavior is normal. Judgment and thought content normal. Cognition and memory are normal.   Nursing note and vitals reviewed.      Results for orders placed or performed in visit on 08/27/19   POCT Influenza A/B   Result Value Ref Range    Rapid Influenza A Ag Negative Negative    Rapid Influenza B Ag Negative Negative     Acceptable Yes        Assessment:       1. Viral syndrome    2. Fever, unspecified fever cause        Plan:         Viral syndrome    Fever, unspecified fever cause  -     POCT Influenza A/B    Other orders  -     ketorolac injection 30 mg  -     ibuprofen (ADVIL,MOTRIN) 800 MG tablet; Take 1 tablet (800 mg total) by mouth every 6 (six) hours as needed.  Dispense: 30 tablet; Refill: 0      Patient Instructions   PLEASE READ YOUR DISCHARGE INSTRUCTIONS ENTIRELY AS IT CONTAINS IMPORTANT INFORMATION.  You received an anti-inflammatory shot today.  Please do not take any ibuprofen or aleve tonight.  You may resume it tomorrow.   - Rest.    - Drink plenty of fluids.    - Tylenol or Ibuprofen as directed as needed for fever/pain.    - Follow up with your PCP or specialty clinic as directed in the next 1-2 weeks if not improved or as needed.  You can call [phone] to schedule an appointment with the appropriate provider.    - If you were prescribed antibiotics, please take them to completion.  - If you were prescribed a narcotic medication, do not drive or operate heavy equipment or machinery while taking these medications.  - If you  smoke, please stop smoking.  -You must understand that you've received an Urgent Care treatment only and that you may be released before all your medical problems are known or treated. You, the patient, will    arrange for follow up care as instructed.  - Please return to Urgent Care or to the  "Emergency Department if your symptoms worsen.    Patient aware and verbalized understanding.    Viral Syndrome (Adult)  A viral illness may cause a number of symptoms. The symptoms depend on the part of the body that the virus affects. If it settles in your nose, throat, and lungs, it may cause cough, sore throat, congestion, and sometimes headache. If it settles in your stomach and intestinal tract, it may cause vomiting and diarrhea. Sometimes it causes vague symptoms like "aching all over," feeling tired, loss of appetite, or fever.  A viral illness usually lasts 1 to 2 weeks, but sometimes it lasts longer. In some cases, a more serious infection can look like a viral syndrome in the first few days of the illness. You may need another exam and additional tests to know the difference. Watch for the warning signs listed below.  Home care  Follow these guidelines for taking care of yourself at home:  · If symptoms are severe, rest at home for the first 2 to 3 days.  · Stay away from cigarette smoke - both your smoke and the smoke from others.  · You may use over-the-counter acetaminophen or ibuprofen for fever, muscle aching, and headache, unless another medicine was prescribed for this. If you have chronic liver or kidney disease or ever had a stomach ulcer or GI bleeding, talk with your doctor before using these medicines. No one who is younger than 18 and ill with a fever should take aspirin. It may cause severe disease or death.  · Your appetite may be poor, so a light diet is fine. Avoid dehydration by drinking 8 to 12 8-ounce glasses of fluids each day. This may include water; orange juice; lemonade; apple, grape, and cranberry juice; clear fruit drinks; electrolyte replacement and sports drinks; and decaffeinated teas and coffee. If you have been diagnosed with a kidney disease, ask your doctor how much and what types of fluids you should drink to prevent dehydration. If you have kidney disease, drinking too " much fluid can cause it build up in the your body and be dangerous to your health.  · Over-the-counter remedies won't shorten the length of the illness but may be helpful for cough, sore throat; and nasal and sinus congestion. Don't use decongestants if you have high blood pressure.  Follow-up care  Follow up with your healthcare provider if you do not improve over the next week.  Call 911  Get emergency medical care if any of the following occur:  · Convulsion  · Feeling weak, dizzy, or like you are going to faint  · Chest pain, shortness of breath, wheezing, or difficulty breathing  When to seek medical advice  Call your healthcare provider right away if any of these occur:  · Cough with lots of colored sputum (mucus) or blood in your sputum  · Chest pain, shortness of breath, wheezing, or difficulty breathing  · Severe headache; face, neck, or ear pain  · Severe, constant pain in the lower right side of your belly (abdominal)  · Continued vomiting (cant keep liquids down)  · Frequent diarrhea (more than 5 times a day); blood (red or black color) or mucus in diarrhea  · Feeling weak, dizzy, or like you are going to faint  · Extreme thirst  · Fever of 100.4°F (38°C) or higher, or as directed by your healthcare provider  Date Last Reviewed: 9/25/2015  © 0026-9324 [employer]. [address] [place] [address], [place] [address]. All rights reserved. This information is not intended as a substitute for professional medical care. Always follow your healthcare professional's instructions.

## 2019-08-27 NOTE — TELEPHONE ENCOUNTER
Gave patient provider's recommendations and offered to bring him in if her felt like her needed to be seen- he stated that he will wait to see if s/s improved no drainage or localized s/s stated.

## 2019-08-27 NOTE — PATIENT INSTRUCTIONS
"PLEASE READ YOUR DISCHARGE INSTRUCTIONS ENTIRELY AS IT CONTAINS IMPORTANT INFORMATION.  You received an anti-inflammatory shot today.  Please do not take any ibuprofen or aleve tonight.  You may resume it tomorrow.   - Rest.    - Drink plenty of fluids.    - Tylenol or Ibuprofen as directed as needed for fever/pain.    - Follow up with your PCP or specialty clinic as directed in the next 1-2 weeks if not improved or as needed.  You can call [phone] to schedule an appointment with the appropriate provider.    - If you were prescribed antibiotics, please take them to completion.  - If you were prescribed a narcotic medication, do not drive or operate heavy equipment or machinery while taking these medications.  - If you  smoke, please stop smoking.  -You must understand that you've received an Urgent Care treatment only and that you may be released before all your medical problems are known or treated. You, the patient, will    arrange for follow up care as instructed.  - Please return to Urgent Care or to the Emergency Department if your symptoms worsen.    Patient aware and verbalized understanding.    Viral Syndrome (Adult)  A viral illness may cause a number of symptoms. The symptoms depend on the part of the body that the virus affects. If it settles in your nose, throat, and lungs, it may cause cough, sore throat, congestion, and sometimes headache. If it settles in your stomach and intestinal tract, it may cause vomiting and diarrhea. Sometimes it causes vague symptoms like "aching all over," feeling tired, loss of appetite, or fever.  A viral illness usually lasts 1 to 2 weeks, but sometimes it lasts longer. In some cases, a more serious infection can look like a viral syndrome in the first few days of the illness. You may need another exam and additional tests to know the difference. Watch for the warning signs listed below.  Home care  Follow these guidelines for taking care of yourself at home:  · If " symptoms are severe, rest at home for the first 2 to 3 days.  · Stay away from cigarette smoke - both your smoke and the smoke from others.  · You may use over-the-counter acetaminophen or ibuprofen for fever, muscle aching, and headache, unless another medicine was prescribed for this. If you have chronic liver or kidney disease or ever had a stomach ulcer or GI bleeding, talk with your doctor before using these medicines. No one who is younger than 18 and ill with a fever should take aspirin. It may cause severe disease or death.  · Your appetite may be poor, so a light diet is fine. Avoid dehydration by drinking 8 to 12 8-ounce glasses of fluids each day. This may include water; orange juice; lemonade; apple, grape, and cranberry juice; clear fruit drinks; electrolyte replacement and sports drinks; and decaffeinated teas and coffee. If you have been diagnosed with a kidney disease, ask your doctor how much and what types of fluids you should drink to prevent dehydration. If you have kidney disease, drinking too much fluid can cause it build up in the your body and be dangerous to your health.  · Over-the-counter remedies won't shorten the length of the illness but may be helpful for cough, sore throat; and nasal and sinus congestion. Don't use decongestants if you have high blood pressure.  Follow-up care  Follow up with your healthcare provider if you do not improve over the next week.  Call 911  Get emergency medical care if any of the following occur:  · Convulsion  · Feeling weak, dizzy, or like you are going to faint  · Chest pain, shortness of breath, wheezing, or difficulty breathing  When to seek medical advice  Call your healthcare provider right away if any of these occur:  · Cough with lots of colored sputum (mucus) or blood in your sputum  · Chest pain, shortness of breath, wheezing, or difficulty breathing  · Severe headache; face, neck, or ear pain  · Severe, constant pain in the lower right side of  your belly (abdominal)  · Continued vomiting (cant keep liquids down)  · Frequent diarrhea (more than 5 times a day); blood (red or black color) or mucus in diarrhea  · Feeling weak, dizzy, or like you are going to faint  · Extreme thirst  · Fever of 100.4°F (38°C) or higher, or as directed by your healthcare provider  Date Last Reviewed: 9/25/2015  © 3217-3843 [employer]. [address] [place] [address], [place] [address]. All rights reserved. This information is not intended as a substitute for professional medical care. Always follow your healthcare professional's instructions.

## 2019-08-27 NOTE — TELEPHONE ENCOUNTER
----- Message from Alayna Mahan sent at 8/27/2019  1:43 PM CDT -----  Contact: Self   Type: Patient Call Back    What is the request in detail: Pt calling to speak to a nurse regarding health.     Can the clinic reply by [facility]? No    Would the patient rather a call back or a response via [facility]? Call back     Best call back number: [phone]

## 2019-08-27 NOTE — TELEPHONE ENCOUNTER
Fever has been 99.6, with headaches, no nausea/vomiting s/s have been going on the past two days. He is just concerned d/t the recent procedure he had- please advise.

## 2019-08-27 NOTE — LETTER
August 27, 2019      [facility] [address], [address]  [place] [address]  Phone: [phone]  Fax: [phone]       Patient: Angel Durán   YOB: 1955  Date of Visit: 08/27/2019    To Whom It May Concern:    Maisha Durán  was at [facility] on 08/27/2019. He may return to work/school on 8/28/19 with no restrictions. If you have any questions or concerns, or if I can be of further assistance, please do not hesitate to contact me.    Sincerely,    Lina Johnson PA-C

## 2019-08-27 NOTE — TELEPHONE ENCOUNTER
----- Message from Reyna Diaz sent at 8/27/2019 10:59 AM CDT -----  Contact: Patient [phone]  Type: Patient Call Back    Who called: Patient    What is the request in detail: Patient states that he had surgery 2 weeks ago. Yesterday he was feeling like he was getting a cold and today feel like he's coming down with a fever and headaches. Asking to speak to nurse or dr in regards to this.    Would the patient rather a call back or a response via [facility]? Call back    Best call back number: [phone]

## 2019-08-27 NOTE — TELEPHONE ENCOUNTER
I would not expect this to be related to his recent surgery. He can see NP today or tomorrow for a wound check.

## 2019-09-17 ENCOUNTER — OFFICE VISIT (OUTPATIENT)
Dept: UROLOGY | Facility: CLINIC | Age: 64
End: 2019-09-17
Payer: COMMERCIAL

## 2019-09-17 VITALS
HEIGHT: 72 IN | RESPIRATION RATE: 16 BRPM | DIASTOLIC BLOOD PRESSURE: 84 MMHG | SYSTOLIC BLOOD PRESSURE: 164 MMHG | WEIGHT: 191.38 LBS | HEART RATE: 65 BPM | BODY MASS INDEX: 25.92 KG/M2

## 2019-09-17 DIAGNOSIS — L72.9 SCROTAL CYST: Primary | ICD-10-CM

## 2019-09-17 DIAGNOSIS — L02.214 GROIN ABSCESS: ICD-10-CM

## 2019-09-17 PROCEDURE — 3008F BODY MASS INDEX DOCD: CPT | Mod: CPTII,S$GLB,, | Performed by: UROLOGY

## 2019-09-17 PROCEDURE — 99999 PR PBB SHADOW E&M-EST. PATIENT-LVL III: ICD-10-PCS | Mod: PBBFAC,,, | Performed by: UROLOGY

## 2019-09-17 PROCEDURE — 99999 PR PBB SHADOW E&M-EST. PATIENT-LVL III: CPT | Mod: PBBFAC,,, | Performed by: UROLOGY

## 2019-09-17 PROCEDURE — 99214 OFFICE O/P EST MOD 30 MIN: CPT | Mod: S$GLB,,, | Performed by: UROLOGY

## 2019-09-17 PROCEDURE — 99214 PR OFFICE/OUTPT VISIT, EST, LEVL IV, 30-39 MIN: ICD-10-PCS | Mod: S$GLB,,, | Performed by: UROLOGY

## 2019-09-17 PROCEDURE — 3008F PR BODY MASS INDEX (BMI) DOCUMENTED: ICD-10-PCS | Mod: CPTII,S$GLB,, | Performed by: UROLOGY

## 2019-09-17 RX ORDER — PHENOBARBITAL 32.4 MG/1
TABLET ORAL
Refills: 5 | COMMUNITY
Start: 2019-08-26

## 2019-09-17 NOTE — PROGRESS NOTES
"Subjective:       Angel Durán Jr. is a 64 y.o. male who is a new patient who was referred by Dr Cabrales for evaluation of "scrotal cyst".      He reports lump in R scrotum. Saw PCP and  for this previously. At  in 3/19, he had purulent drainage but did not resolve completely after that. Drained spontaneously and I&D not performed. He has noted the "cyst" since then. He has had oozing from the same area since then. Pain resolved.     No GI issues. No prior abscess or skin cysts. Denies voiding problems.     He was given 2 week trial of Bactrim as he declined surgical intervention. Saw Cindy for wound check and was stable. Here again for wound check. He reports continued intermittent drainage.     He is now s/p excision of abnormal area in R groin/scrotum on 8/13/19. Procedure was uncomplicated. He reports he has done well since then. Path - ruptured epidermal cyst      The following portions of the patient's history were reviewed and updated as appropriate: allergies, current medications, past family history, past medical history, past social history, past surgical history and problem list.    Review of Systems  Constitutional: no fever or chills  ENT: no nasal congestion or sore throat  Respiratory: no cough or shortness of breath  Cardiovascular: no chest pain or palpitations  Gastrointestinal: no nausea or vomiting, tolerating diet  Genitourinary: as per HPI  Hematologic/Lymphatic: no easy bruising or lymphadenopathy  Musculoskeletal: no arthralgias or myalgias  Skin: no rashes or lesions  Neurological: no seizures or tremors  Behavioral/Psych: no auditory or visual hallucinations       Objective:    Vitals: BP (!) 164/84   Pulse 65   Resp 16   Ht 6' (1.829 m)   Wt 86.8 kg (191 lb 5.8 oz)   BMI 25.95 kg/m²     Physical Exam   General: well developed, well nourished in no acute distress  Head: normocephalic, atraumatic  Neck: supple, trachea midline, no obvious enlargement of thyroid  HEENT: EOMI, mucus " membranes moist, sclera anicteric, no hearing impairment  Lungs: symmetric expansion, non-labored breathing  Cardiovascular: regular rate and rhythm, normal pulses  Abdomen: soft, non tender, non distended, no palpable masses, no hepatosplenomegaly, no hernias, bladder nonpalpable. No CVA tenderness.  Musculoskeletal: no peripheral edema, normal ROM in bilateral upper and lower extremities  Lymphatics: no cervical or inguinal lymphadenopathy  Skin: no rashes or lesions  Neuro: alert and oriented x 3, no gross deficits  Psych: normal judgment and insight, normal mood/affect and non-anxious  Genitourinary:   Penis -  circumcised penis without plaques, lesions, or scarring.  Urethra - orthotopic location without stenosis.  Scrotum  - no lesions or rashes, no hydrocele or hernia.  Testes - descended bilaterally, symmetric without masses, non tender.  Epididymides - no cysts or lesions, no spermatocele, symmetric   Incision - c/d/i   ANDREW: patient declined exam      Lab Review   Urine analysis today in clinic shows Negative     Lab Results   Component Value Date    WBC 5.10 08/06/2019    HGB 14.5 08/06/2019    HCT 43.4 08/06/2019    MCV 93 08/06/2019     08/06/2019     Lab Results   Component Value Date    CREATININE 1.1 08/06/2019    BUN 16 08/06/2019     No results found for: PSA  No results found for: PSADIAG    Imaging  NA       Assessment/Plan:      1. Scrotal cyst    - Discussed surgical excision. Does not seem well encapsulated but could try excision of abnormal area to allow closure of wound after. He is apprehensive for a procedure.    - Reviewed possible s/s of worsening infection requiring I&D requiring packing, healing by secondary intention.   - Opted to try another round of abx first prior to surgery. Suspect this will not clear wound completely but okay to try first as wound appears stable.    - Bactrim x 21d     - s/p excision 8/13/19. Discussed risk of recurrence. Doing well.     2. Groin abscess     - Will monitor. s/p excision       Follow up PRN

## 2019-12-06 ENCOUNTER — HOSPITAL ENCOUNTER (EMERGENCY)
Facility: HOSPITAL | Age: 64
Discharge: HOME | End: 2019-12-07
Attending: EMERGENCY MEDICINE
Payer: COMMERCIAL

## 2019-12-06 DIAGNOSIS — J18.9 COMMUNITY ACQUIRED PNEUMONIA OF RIGHT LOWER LOBE OF LUNG: Primary | ICD-10-CM

## 2019-12-06 DIAGNOSIS — R05.9 COUGH: ICD-10-CM

## 2019-12-06 PROCEDURE — 99283 EMERGENCY DEPT VISIT LOW MDM: CPT | Mod: 25,ER

## 2019-12-06 RX ORDER — AZITHROMYCIN 250 MG/1
TABLET, FILM COATED ORAL
Qty: 6 TABLET | Refills: 0 | Status: SHIPPED | OUTPATIENT
Start: 2019-12-06

## 2019-12-07 VITALS
WEIGHT: 205 LBS | RESPIRATION RATE: 18 BRPM | OXYGEN SATURATION: 96 % | DIASTOLIC BLOOD PRESSURE: 87 MMHG | BODY MASS INDEX: 27.77 KG/M2 | HEIGHT: 72 IN | TEMPERATURE: 99 F | SYSTOLIC BLOOD PRESSURE: 175 MMHG | HEART RATE: 70 BPM

## 2019-12-07 NOTE — ED PROVIDER NOTES
Encounter Date: 12/6/2019    SCRIBE #1 NOTE: I, Emily Baker, am scribing for, and in the presence of,  CRISTA Dyer. I have scribed the following portions of the note - Other sections scribed: HPI, ROS, PE.       History     Chief Complaint   Patient presents with    Cough     pt had a single coughing spell at home and found blood in his sputum     64 year old male complains of cough x 1 day. He notes one episode of hemoptysis tonight. He notes sitting down and all of sudden he just started coughing up bright red blood. He states as the coughing continued the amount of blood started to decreased. He notes feeling fine prior to episode. He notes mild throat pain. He denies this happening before. He denies weight loss, night sweats, or fatigue.  He does not smoke.  He notes PMHx of seizures. Last seizure was more than 10 years ago. He denies any food going down the wrong way while eating.     The history is provided by the patient. No  was used.     Review of patient's allergies indicates:  No Known Allergies  Past Medical History:   Diagnosis Date    Seizures      Past Surgical History:   Procedure Laterality Date    EXCISION OF LESION OF SCROTUM Right 8/13/2019    Procedure: EXCISION, LESION, SCROTUM;  Surgeon: Pat Garrett MD;  Location: [facility];  Service: Urology;  Laterality: Right;  RN PRE OP 8-6-19    KNEE SURGERY  1990    Lt knee scope     Family History   Problem Relation Age of Onset    Breast cancer Mother     No Known Problems Father     Thyroid disease Sister     No Known Problems Brother     No Known Problems Brother     No Known Problems Sister     No Known Problems Son     Seizures Daughter      Social History     Tobacco Use    Smoking status: Never Smoker    Smokeless tobacco: Never Used   Substance Use Topics    Alcohol use: No    Drug use: Never     Review of Systems   Constitutional: Negative.  Negative for diaphoresis, fatigue, fever and  unexpected weight change.   HENT: Negative.  Negative for sore throat.    Eyes: Negative.    Respiratory: Positive for cough (Low volume hemoptysis). Negative for shortness of breath.    Cardiovascular: Negative.  Negative for chest pain.   Gastrointestinal: Negative.  Negative for abdominal pain and vomiting.   Genitourinary: Negative.    Musculoskeletal: Negative.  Negative for back pain.   Skin: Negative.  Negative for rash.   Neurological: Negative.  Negative for headaches.   Hematological: Negative.    Psychiatric/Behavioral: Negative.    All other systems reviewed and are negative.      Physical Exam     Initial Vitals [12/06/19 2158]   BP Pulse Resp Temp SpO2   (!) 164/101 80 18 98.7 °F (37.1 °C) 97 %      MAP       --         Physical Exam    Nursing note and vitals reviewed.  Constitutional: He appears well-developed and well-nourished.   HENT:   Head: Normocephalic and atraumatic.   Eyes: Conjunctivae and EOM are normal.   Neck: Normal range of motion. Neck supple.   Cardiovascular: Normal rate, regular rhythm, normal heart sounds and intact distal pulses.   Pulmonary/Chest: Effort normal and breath sounds normal. No respiratory distress. He has no wheezes. He has no rhonchi. He has no rales. He exhibits no tenderness.   Musculoskeletal: Normal range of motion.   Neurological: He is alert and oriented to person, place, and time.   Skin: Skin is warm and dry.   Psychiatric: He has a normal mood and affect. His behavior is normal.         ED Course   Procedures  Labs Reviewed - No data to display       Imaging Results          X-Ray Chest PA And Lateral (Final result)  Result time 12/06/19 23:26:13    Final result by Chetan Andrea MD (12/06/19 23:26:13)                 Impression:      Right lung base airspace opacity.  Findings could reflect developing pneumonia in the right clinical setting.  Future radiographic follow-up is recommended to ensure resolution.      Electronically signed by: Chetan  MD Emre  Date:    12/06/2019  Time:    23:26             Narrative:    EXAMINATION:  XR CHEST PA AND LATERAL    CLINICAL HISTORY:  Cough    TECHNIQUE:  PA and lateral views of the chest were performed.    COMPARISON:  None    FINDINGS:  Cardiac silhouette is normal in size.  Lungs are symmetrically expanded.  Abnormal airspace opacification is visualized within the right lower lung zone.  No evidence of pneumothorax or significant pleural effusion.  No acute osseous abnormality identified.                              X-Rays:   Independently Interpreted Readings:   Chest X-Ray: Normal heart size. There is an infiltrate in the RLL. No pneumothorax or nodules     Medical Decision Making:   History:   Old Medical Records: I decided to obtain old medical records.  Clinical Tests:   Radiological Study: Ordered and Reviewed  ED Management:  64-year-old generally healthy male with small volume hemoptysis tonight and chest x-ray concerning for right lower lobe pneumonia.  No history or risk factors to suggest malignancy, TB, or PE.  Patient is well-appearing in no distress. He is not septic.  No indication for admit or IV antibiotics.  Will treat with azithromycin and have patient follow up with PCP.  He was discharged with resources and follow-up instructions.            Scribe Attestation:   Scribe #1: I performed the above scribed service and the documentation accurately describes the services I performed. I attest to the accuracy of the note.     Aníbal Escamilla                      Clinical Impression:     1. Community acquired pneumonia of right lower lobe of lung    2. Cough                                Aníbal Escamilla, CHELE  12/07/19 0014

## 2020-01-20 ENCOUNTER — OFFICE VISIT (OUTPATIENT)
Dept: URGENT CARE | Facility: CLINIC | Age: 65
End: 2020-01-20
Payer: COMMERCIAL

## 2020-01-20 VITALS
HEIGHT: 72 IN | HEART RATE: 73 BPM | WEIGHT: 205 LBS | BODY MASS INDEX: 27.77 KG/M2 | DIASTOLIC BLOOD PRESSURE: 76 MMHG | RESPIRATION RATE: 12 BRPM | OXYGEN SATURATION: 95 % | TEMPERATURE: 98 F | SYSTOLIC BLOOD PRESSURE: 169 MMHG

## 2020-01-20 DIAGNOSIS — R05.8 COUGH PRODUCTIVE OF PURULENT SPUTUM: ICD-10-CM

## 2020-01-20 DIAGNOSIS — J18.9 PNEUMONIA OF RIGHT LOWER LOBE DUE TO INFECTIOUS ORGANISM: Primary | ICD-10-CM

## 2020-01-20 DIAGNOSIS — Z87.01 HISTORY OF RECENT PNEUMONIA: ICD-10-CM

## 2020-01-20 PROCEDURE — 99214 PR OFFICE/OUTPT VISIT, EST, LEVL IV, 30-39 MIN: ICD-10-PCS | Mod: S$GLB,,, | Performed by: PHYSICIAN ASSISTANT

## 2020-01-20 PROCEDURE — 99214 OFFICE O/P EST MOD 30 MIN: CPT | Mod: S$GLB,,, | Performed by: PHYSICIAN ASSISTANT

## 2020-01-20 PROCEDURE — 71046 X-RAY EXAM CHEST 2 VIEWS: CPT | Mod: S$GLB,,, | Performed by: RADIOLOGY

## 2020-01-20 PROCEDURE — 71046 XR CHEST PA AND LATERAL: ICD-10-PCS | Mod: S$GLB,,, | Performed by: RADIOLOGY

## 2020-01-20 RX ORDER — DOXYCYCLINE 100 MG/1
100 CAPSULE ORAL 2 TIMES DAILY
Qty: 20 CAPSULE | Refills: 0 | Status: SHIPPED | OUTPATIENT
Start: 2020-01-20 | End: 2020-01-30

## 2020-01-20 RX ORDER — GUAIFENESIN 600 MG/1
600 TABLET, EXTENDED RELEASE ORAL 2 TIMES DAILY
Qty: 20 TABLET | Refills: 0 | COMMUNITY
Start: 2020-01-20 | End: 2020-01-30

## 2020-01-20 RX ORDER — LIDOCAINE HYDROCHLORIDE 10 MG/ML
2 INJECTION INFILTRATION; PERINEURAL
Status: COMPLETED | OUTPATIENT
Start: 2020-01-20 | End: 2020-01-20

## 2020-01-20 RX ORDER — ALBUTEROL SULFATE 90 UG/1
2 AEROSOL, METERED RESPIRATORY (INHALATION) EVERY 4 HOURS PRN
Qty: 1 INHALER | Refills: 0 | Status: SHIPPED | OUTPATIENT
Start: 2020-01-20

## 2020-01-20 RX ORDER — CEFTRIAXONE 1 G/1
1 INJECTION, POWDER, FOR SOLUTION INTRAMUSCULAR; INTRAVENOUS
Status: COMPLETED | OUTPATIENT
Start: 2020-01-20 | End: 2020-01-20

## 2020-01-20 RX ADMIN — LIDOCAINE HYDROCHLORIDE 2 ML: 10 INJECTION INFILTRATION; PERINEURAL at 06:01

## 2020-01-20 RX ADMIN — CEFTRIAXONE 1 G: 1 INJECTION, POWDER, FOR SOLUTION INTRAMUSCULAR; INTRAVENOUS at 06:01

## 2020-01-20 NOTE — PROGRESS NOTES
Subjective:       Patient ID: Angel Durán Jr. is a 64 y.o. male.    Vitals:  height is 6' (1.829 m) and weight is 93 kg (205 lb). His temperature is 97.6 °F (36.4 °C). His blood pressure is 169/76 (abnormal) and his pulse is 73. His respiration is 12 and oxygen saturation is 95%.     Chief Complaint: Cough    Cough with mucous w/ brown specks for 1 week. States that he was seen in the ER for one episode of hemoptysis last month and was diagnosed with pneumonia on the right lung. States he completed full course of azithromycin as directed, but states he started feeling bad again about a week ago. Denies SOB, CP, fever, chills, hemoptysis.     Cough   This is a new problem. The current episode started in the past 7 days. The problem has been gradually improving. Pertinent negatives include no chest pain, chills, fever, headaches, hemoptysis, myalgias, rash, sore throat, shortness of breath or wheezing.       Constitution: Negative for chills, sweating, fatigue and fever.   HENT: Negative for congestion and sore throat.    Neck: Negative for painful lymph nodes.   Cardiovascular: Negative for chest pain and leg swelling.   Eyes: Negative for double vision and blurred vision.   Respiratory: Positive for cough and sputum production. Negative for bloody sputum, shortness of breath, wheezing and asthma.    Gastrointestinal: Negative for abdominal pain, nausea, vomiting and diarrhea.   Genitourinary: Negative for dysuria, frequency and urgency.   Musculoskeletal: Negative for joint pain, joint swelling, muscle cramps and muscle ache.   Skin: Negative for color change, pale and rash.   Allergic/Immunologic: Negative for seasonal allergies and asthma.   Neurological: Negative for dizziness, history of vertigo, light-headedness, passing out and headaches.   Hematologic/Lymphatic: Negative for swollen lymph nodes, easy bruising/bleeding and history of blood clots. Does not bruise/bleed easily.   Psychiatric/Behavioral:  Negative for nervous/anxious, sleep disturbance and depression. The patient is not nervous/anxious.        Objective:      Physical Exam   Constitutional: He is oriented to person, place, and time. He appears well-developed and well-nourished. He is cooperative.  Non-toxic appearance. He does not have a sickly appearance. He does not appear ill. No distress.   Patient is sitting pleasantly on exam table in no acute distress. Nontoxic appearing.    HENT:   Head: Normocephalic and atraumatic.   Right Ear: Hearing, tympanic membrane, external ear and ear canal normal.   Left Ear: Hearing, tympanic membrane, external ear and ear canal normal.   Nose: Nose normal. No mucosal edema, rhinorrhea or nasal deformity. No epistaxis. Right sinus exhibits no maxillary sinus tenderness and no frontal sinus tenderness. Left sinus exhibits no maxillary sinus tenderness and no frontal sinus tenderness.   Mouth/Throat: Uvula is midline and mucous membranes are normal. No trismus in the jaw. Normal dentition. No uvula swelling. Posterior oropharyngeal erythema and cobblestoning present. No oropharyngeal exudate or posterior oropharyngeal edema. No tonsillar exudate.   Eyes: Pupils are equal, round, and reactive to light. Conjunctivae and lids are normal. No scleral icterus.   Neck: Trachea normal, full passive range of motion without pain and phonation normal. Neck supple. No neck rigidity. No edema and no erythema present.   Cardiovascular: Normal rate, regular rhythm, normal heart sounds, intact distal pulses and normal pulses.   Pulmonary/Chest: Effort normal. No respiratory distress. He has no decreased breath sounds. He has no wheezes. He has no rhonchi. He has rales in the right lower field.   No wheezing noted on auscultation. No signs of respiratory distress noted on exam. O2 sat 95% on RA.    Abdominal: Normal appearance.   Musculoskeletal: Normal range of motion. He exhibits no edema or deformity.   Lymphadenopathy:     He  has no cervical adenopathy.   Neurological: He is alert and oriented to person, place, and time. He exhibits normal muscle tone. Coordination normal.   Skin: Skin is warm, dry, intact, not diaphoretic and not pale.   Psychiatric: He has a normal mood and affect. His speech is normal and behavior is normal. Judgment and thought content normal. Cognition and memory are normal.   Nursing note and vitals reviewed.        X-ray Chest Pa And Lateral  Result Date: 1/20/2020  EXAMINATION: XR CHEST PA AND LATERAL CLINICAL HISTORY: COUGH; Cough TECHNIQUE: PA and lateral views of the chest were performed. COMPARISON: Chest radiograph 12/06/2019 FINDINGS: Overall improved aeration of the right lung base with significantly smaller opacity suggesting improving airspace disease.  Bandlike opacity within the right middle lobe suggesting platelike scarring versus atelectasis.  Remainder of the right lung is clear.  Left hemithorax is well expanded and clear.  No pleural effusion or pneumothorax. Cardiomediastinal silhouette is midline and within normal limits.  Pulmonary vasculature and hilar regions are within normal limits.   Improved aeration of the right lung base with significantly smaller opacities suggesting improving airspace disease.  Consider short-term follow-up chest radiography to ensure complete resolution. Electronically signed by: Avtar Rivers MD Date:    01/20/2020 Time:  17:44      Pneumonia improved but does not appear completely resolved after completion of azithromycin treatment from 12/06/19 visit. Will give 1G rocephin and treat with doxy outpatient. Advised to follow up in 10 days here or with PCP for repeat CXR. Advised on strict ER precautions. Answered all patient questions. Patient verbalized understanding and voiced agreement with current treatment plan.    Assessment:       1. Pneumonia of right lower lobe due to infectious organism    2. Cough productive of purulent sputum    3. History of recent  pneumonia        Plan:         Pneumonia of right lower lobe due to infectious organism  -     cefTRIAXone injection 1 g  -     lidocaine HCL 10 mg/ml (1%) injection 2 mL  -     doxycycline (VIBRAMYCIN) 100 MG Cap; Take 1 capsule (100 mg total) by mouth 2 (two) times daily. for 10 days  Dispense: 20 capsule; Refill: 0  -     guaiFENesin (MUCINEX) 600 mg 12 hr tablet; Take 1 tablet (600 mg total) by mouth 2 (two) times daily. for 10 days  Dispense: 20 tablet; Refill: 0  -     albuterol (PROVENTIL/VENTOLIN HFA) 90 mcg/actuation inhaler; Inhale 2 puffs into the lungs every 4 (four) hours as needed for Wheezing or Shortness of Breath. Rescue  Dispense: 1 Inhaler; Refill: 0    Cough productive of purulent sputum  -     X-Ray Chest PA And Lateral; Future; Expected date: 01/20/2020    History of recent pneumonia  -     X-Ray Chest PA And Lateral; Future; Expected date: 01/20/2020      Patient Instructions     If your condition worsens or fails to improve we recommend that you receive another evaluation at the ER immediately or contact your PCP to discuss your concerns or return here. You must understand that you've received an urgent care treatment only and that you may be released before all your medical problems are known or treated. You the patient will arrange for followup care as instructed.    Rest and fluids are important.  Can use honey with tabitha to soothe your throat.    Take full course of antibiotics as directed.    Take inhaler as prescribed and needed for wheezing.  Take mucinex as directed to help with chest congestion.    -  Flonase (fluticasone) is a nasal spray which is available over the counter and may help with symptoms of nasal congestion.     -  Tylenol or ibuprofen can also be used as directed for pain unless you have an allergy to them or medical condition such as stomach ulcers, kidney or liver disease or blood thinners etc for which you should not be taking these type of medications.     Please  follow up with your primary care doctor or specialist in the next 48-72hrs as needed and if no improvement.    Repeat Chest Xray in the next 10 days.    Please stop smoking.        Pneumonia (Adult)  Pneumonia is an infection deep within the lungs. It is in the small air sacs (alveoli). Pneumonia may be caused by a virus or bacteria. Pneumonia caused by bacteria is usually treated with an antibiotic. Severe cases may need to be treated in the hospital. Milder cases can be treated at home. Symptoms usually start to get better during the first 2 days of treatment.    Home care  Follow these guidelines when caring for yourself at home:  · Rest at home for the first 2 to 3 days, or until you feel stronger. Dont let yourself get overly tired when you go back to your activities.  · Stay away from cigarette smoke - yours or other peoples.  · You may use acetaminophen or ibuprofen to control fever or pain, unless another medicine was prescribed. If you have chronic liver or kidney disease, talk with your healthcare provider before using these medicines. Also talk with your provider if youve had a stomach ulcer or gastrointestinal bleeding. Dont give aspirin to anyone younger than 18 years of age who is ill with a fever. It may cause severe liver damage.  · Your appetite may be poor, so a light diet is fine.  · Drink 6 to 8 glasses of fluids every day to make sure you are getting enough fluids. Beverages can include water, sport drinks, sodas without caffeine, juices, tea, or soup. Fluids will help loosen secretions in the lung. This will make it easier for you to cough up the phlegm (sputum). If you also have heart or kidney disease, check with your healthcare provider before you drink extra fluids.  · Take antibiotic medicine prescribed until it is all gone, even if you are feeling better after a few days.  Follow-up care  Follow up with your healthcare provider in the next 2 to 3 days, or as advised. This is to be  sure the medicine is helping you get better.  If you are 65 or older, you should get a pneumococcal vaccine and a yearly flu (influenza) shot. You should also get these vaccines if you have chronic lung disease like asthma, emphysema, or COPD. Recently, a second type of pneumonia vaccine has become available for everyone over 65 years old. This is in addition to the previous vaccine. Ask your provider about this.  When to seek medical advice  Call your healthcare provider right away if any of these occur:  · You dont get better within the first 48 hours of treatment  · Shortness of breath gets worse  · Rapid breathing (more than 25 breaths per minute)  · Coughing up blood  · Chest pain gets worse with breathing  · Fever of 100.4°F (38°C) or higher that doesnt get better with fever medicine  · Weakness, dizziness, or fainting that gets worse  · Thirst or dry mouth that gets worse  · Sinus pain, headache, or a stiff neck  · Chest pain not caused by coughing  Date Last Reviewed: 1/1/2017  © 5141-6893 [employer]. [address] [place] [address], [place] [address]. All rights reserved. This information is not intended as a substitute for professional medical care. Always follow your healthcare professional's instructions.

## 2020-01-20 NOTE — PATIENT INSTRUCTIONS
If your condition worsens or fails to improve we recommend that you receive another evaluation at the ER immediately or contact your PCP to discuss your concerns or return here. You must understand that you've received an urgent care treatment only and that you may be released before all your medical problems are known or treated. You the patient will arrange for followup care as instructed.    Rest and fluids are important.  Can use honey with tabitha to soothe your throat.    Take full course of antibiotics as directed.    Take inhaler as prescribed and needed for wheezing.  Take mucinex as directed to help with chest congestion.    -  Flonase (fluticasone) is a nasal spray which is available over the counter and may help with symptoms of nasal congestion.     -  Tylenol or ibuprofen can also be used as directed for pain unless you have an allergy to them or medical condition such as stomach ulcers, kidney or liver disease or blood thinners etc for which you should not be taking these type of medications.     Please follow up with your primary care doctor or specialist in the next 48-72hrs as needed and if no improvement.    Repeat Chest Xray in the next 10 days.    Please stop smoking.        Pneumonia (Adult)  Pneumonia is an infection deep within the lungs. It is in the small air sacs (alveoli). Pneumonia may be caused by a virus or bacteria. Pneumonia caused by bacteria is usually treated with an antibiotic. Severe cases may need to be treated in the hospital. Milder cases can be treated at home. Symptoms usually start to get better during the first 2 days of treatment.    Home care  Follow these guidelines when caring for yourself at home:  · Rest at home for the first 2 to 3 days, or until you feel stronger. Dont let yourself get overly tired when you go back to your activities.  · Stay away from cigarette smoke - yours or other peoples.  · You may use acetaminophen or ibuprofen to control fever or pain,  unless another medicine was prescribed. If you have chronic liver or kidney disease, talk with your healthcare provider before using these medicines. Also talk with your provider if youve had a stomach ulcer or gastrointestinal bleeding. Dont give aspirin to anyone younger than 18 years of age who is ill with a fever. It may cause severe liver damage.  · Your appetite may be poor, so a light diet is fine.  · Drink 6 to 8 glasses of fluids every day to make sure you are getting enough fluids. Beverages can include water, sport drinks, sodas without caffeine, juices, tea, or soup. Fluids will help loosen secretions in the lung. This will make it easier for you to cough up the phlegm (sputum). If you also have heart or kidney disease, check with your healthcare provider before you drink extra fluids.  · Take antibiotic medicine prescribed until it is all gone, even if you are feeling better after a few days.  Follow-up care  Follow up with your healthcare provider in the next 2 to 3 days, or as advised. This is to be sure the medicine is helping you get better.  If you are 65 or older, you should get a pneumococcal vaccine and a yearly flu (influenza) shot. You should also get these vaccines if you have chronic lung disease like asthma, emphysema, or COPD. Recently, a second type of pneumonia vaccine has become available for everyone over 65 years old. This is in addition to the previous vaccine. Ask your provider about this.  When to seek medical advice  Call your healthcare provider right away if any of these occur:  · You dont get better within the first 48 hours of treatment  · Shortness of breath gets worse  · Rapid breathing (more than 25 breaths per minute)  · Coughing up blood  · Chest pain gets worse with breathing  · Fever of 100.4°F (38°C) or higher that doesnt get better with fever medicine  · Weakness, dizziness, or fainting that gets worse  · Thirst or dry mouth that gets worse  · Sinus pain,  headache, or a stiff neck  · Chest pain not caused by coughing  Date Last Reviewed: 1/1/2017  © 4401-8605 [employer]. [address] [place] [address], [place] [address]. All rights reserved. This information is not intended as a substitute for professional medical care. Always follow your healthcare professional's instructions.

## 2020-01-30 ENCOUNTER — OFFICE VISIT (OUTPATIENT)
Dept: URGENT CARE | Facility: CLINIC | Age: 65
End: 2020-01-30
Payer: COMMERCIAL

## 2020-01-30 VITALS
RESPIRATION RATE: 17 BRPM | OXYGEN SATURATION: 96 % | HEART RATE: 68 BPM | TEMPERATURE: 98 F | WEIGHT: 205 LBS | SYSTOLIC BLOOD PRESSURE: 160 MMHG | BODY MASS INDEX: 27.77 KG/M2 | DIASTOLIC BLOOD PRESSURE: 84 MMHG | HEIGHT: 72 IN

## 2020-01-30 DIAGNOSIS — J18.9 PNEUMONIA OF RIGHT LOWER LOBE DUE TO INFECTIOUS ORGANISM: Primary | ICD-10-CM

## 2020-01-30 DIAGNOSIS — Z87.01 HISTORY OF RECENT PNEUMONIA: ICD-10-CM

## 2020-01-30 PROCEDURE — 71046 XR CHEST PA AND LATERAL: ICD-10-PCS | Mod: S$GLB,,, | Performed by: RADIOLOGY

## 2020-01-30 PROCEDURE — 71046 X-RAY EXAM CHEST 2 VIEWS: CPT | Mod: S$GLB,,, | Performed by: RADIOLOGY

## 2020-01-30 PROCEDURE — 99214 OFFICE O/P EST MOD 30 MIN: CPT | Mod: S$GLB,,, | Performed by: PHYSICIAN ASSISTANT

## 2020-01-30 PROCEDURE — 99214 PR OFFICE/OUTPT VISIT, EST, LEVL IV, 30-39 MIN: ICD-10-PCS | Mod: S$GLB,,, | Performed by: PHYSICIAN ASSISTANT

## 2020-01-30 NOTE — PROGRESS NOTES
Subjective:       Patient ID: Angel Durán Jr. is a 64 y.o. male.    Vitals:  height is 6' (1.829 m) and weight is 93 kg (205 lb). His temperature is 97.7 °F (36.5 °C). His blood pressure is 160/84 (abnormal) and his pulse is 68. His respiration is 17 and oxygen saturation is 96%.     Chief Complaint: URI    Pt presents of follow up and repeat CXR. Pt reports he was treated 10 days ago for pneumonia of right lobe with doxy and rocephin after initial failure on azithromycin. States all symptoms have resolved after completion of treatment. Denies wheezing, SOB, CP/lung pain. States he does still have an occasional mild dry cough, but states he no longer has any sputum production and cough is much less severe than it was before treatment. States he is jsut here today for a repeat chest Xray as he was instructed at his last visit.     URI    This is a new problem. Episode onset: 01/20/2020. The problem has been resolved. There has been no fever. Associated symptoms include coughing (mild, nonconstant, nonproductive ). Pertinent negatives include no abdominal pain, chest pain, congestion, diarrhea, ear pain, headaches, nausea, rash, sinus pain, sore throat, vomiting or wheezing. He has tried nothing for the symptoms. The treatment provided no relief.       Constitution: Negative for chills, sweating, fatigue and fever.   HENT: Negative for ear pain, congestion, sinus pain, sinus pressure, sore throat and voice change.    Neck: Negative for painful lymph nodes.   Cardiovascular: Negative for chest pain.   Eyes: Negative for eye redness.   Respiratory: Positive for cough (mild, nonconstant, nonproductive ). Negative for chest tightness, sputum production, bloody sputum, COPD, shortness of breath, stridor, wheezing and asthma.    Gastrointestinal: Negative for abdominal pain, nausea, vomiting, constipation and diarrhea.   Musculoskeletal: Negative for muscle ache.   Skin: Negative for rash.   Allergic/Immunologic:  Negative for seasonal allergies and asthma.   Neurological: Negative for dizziness, light-headedness, headaches, numbness and tingling.   Hematologic/Lymphatic: Negative for swollen lymph nodes.       Objective:      Physical Exam   Constitutional: He is oriented to person, place, and time. He appears well-developed and well-nourished. He is cooperative.  Non-toxic appearance. He does not have a sickly appearance. He does not appear ill. No distress.   Patient is sitting pleasantly on exam table in no acute distress. Nontoxic appearing.    HENT:   Head: Normocephalic and atraumatic.   Right Ear: Hearing, tympanic membrane, external ear and ear canal normal.   Left Ear: Hearing, tympanic membrane, external ear and ear canal normal.   Nose: Nose normal. No mucosal edema, rhinorrhea or nasal deformity. No epistaxis. Right sinus exhibits no maxillary sinus tenderness and no frontal sinus tenderness. Left sinus exhibits no maxillary sinus tenderness and no frontal sinus tenderness.   Mouth/Throat: Uvula is midline, oropharynx is clear and moist and mucous membranes are normal. No trismus in the jaw. Normal dentition. No uvula swelling. No oropharyngeal exudate, posterior oropharyngeal edema or posterior oropharyngeal erythema.   Eyes: Pupils are equal, round, and reactive to light. Conjunctivae and lids are normal. No scleral icterus.   Neck: Trachea normal, full passive range of motion without pain and phonation normal. Neck supple. No neck rigidity. No edema and no erythema present.   Cardiovascular: Normal rate, regular rhythm, normal heart sounds, intact distal pulses and normal pulses.   Pulmonary/Chest: Effort normal and breath sounds normal. No stridor. No respiratory distress. He has no decreased breath sounds. He has no wheezes. He has no rhonchi. He has no rales. He exhibits no tenderness.   Abdominal: Normal appearance.   Musculoskeletal: Normal range of motion. He exhibits no edema or deformity.    Lymphadenopathy:     He has no cervical adenopathy.   Neurological: He is alert and oriented to person, place, and time. He exhibits normal muscle tone. Coordination normal.   Skin: Skin is warm, dry, intact, not diaphoretic and not pale.   Psychiatric: He has a normal mood and affect. His speech is normal and behavior is normal. Judgment and thought content normal. Cognition and memory are normal.   Nursing note and vitals reviewed.          Xr Chest Pa And Lateral  Result Date: 1/30/2020  EXAMINATION: XR CHEST PA AND LATERAL CLINICAL HISTORY: Lobar pneumonia, unspecified organism TECHNIQUE: PA and lateral views of the chest were performed. COMPARISON: Chest radiograph 01/20/2020 and 12/06/2019. FINDINGS: Right middle lobe platelike scarring versus atelectasis.  There is continued subtle opacity projected over the right lung base on the frontal view without definite focal opacity seen on the lateral view.  While this could represent some sequela of subsegmental scarring versus atelectasis, residual airspace disease including pneumonia can not be excluded.  Otherwise, no large consolidation or new focal opacity.  No pleural effusion or pneumothorax.  Pulmonary vasculature and hilar regions are within normal limits.  Chronic mild nonspecific elevation of the right hemidiaphragm.  Cardiomediastinal silhouette is midline and within normal limits.  Osseous structures appear intact.   Continued subtle opacity projected over the right lung base which could represent sequela of subsegmental scarring versus atelectasis; however, residual airspace disease including pneumonia can not be excluded.  Clinical correlation advised.  Further evaluation with CT thorax can be obtained if clinically warranted. This report was flagged in Epic as abnormal. Electronically signed by: Avtar Rivers MD Date:    01/30/2020 Time:    17:31      Discussed patient with Dr. [place] who recommended outpatient CT scan tomorrow for further evaluation  and close follow up with PCP. Also advised if pt is asymptomatic, PE is benign and vitals are stable to refrain from additional antibiotics pending CT results.  CT scan was scheduled for 7:30am tomorrow.  Advised on strict ER precautions. Stressed importance of close follow up with PCP. Answered all patient questions. Patient verbalized understanding and voiced agreement with current treatment plan.      Assessment:       1. Pneumonia of right lower lobe due to infectious organism    2. History of recent pneumonia        Plan:         Pneumonia of right lower lobe due to infectious organism  -     CT Chest Without Contrast; Future; Expected date: 01/30/2020    History of recent pneumonia  -     XR CHEST PA AND LATERAL; Future; Expected date: 01/30/2020  -     CT Chest Without Contrast; Future; Expected date: 01/30/2020      Patient Instructions     CT was scheduled for tomorrow. Please go to appointment as scheduled.     We will call you tomorrow with the results.    Please follow up with your PCP in 48-72 hours.     If your condition worsens or fails to improve we recommend that you receive another evaluation at the ER immediately or contact your PCP to discuss your concerns or return here. You must understand that you've received an urgent care treatment only and that you may be released before all your medical problems are known or treated. You the patient will arrange for followup care as instructed.             Pneumonia (Adult)  Pneumonia is an infection deep within the lungs. It is in the small air sacs (alveoli). Pneumonia may be caused by a virus or bacteria. Pneumonia caused by bacteria is usually treated with an antibiotic. Severe cases may need to be treated in the hospital. Milder cases can be treated at home. Symptoms usually start to get better during the first 2 days of treatment.    Home care  Follow these guidelines when caring for yourself at home:  · Rest at home for the first 2 to 3 days, or  until you feel stronger. Dont let yourself get overly tired when you go back to your activities.  · Stay away from cigarette smoke - yours or other peoples.  · You may use acetaminophen or ibuprofen to control fever or pain, unless another medicine was prescribed. If you have chronic liver or kidney disease, talk with your healthcare provider before using these medicines. Also talk with your provider if youve had a stomach ulcer or gastrointestinal bleeding. Dont give aspirin to anyone younger than 18 years of age who is ill with a fever. It may cause severe liver damage.  · Your appetite may be poor, so a light diet is fine.  · Drink 6 to 8 glasses of fluids every day to make sure you are getting enough fluids. Beverages can include water, sport drinks, sodas without caffeine, juices, tea, or soup. Fluids will help loosen secretions in the lung. This will make it easier for you to cough up the phlegm (sputum). If you also have heart or kidney disease, check with your healthcare provider before you drink extra fluids.  · Take antibiotic medicine prescribed until it is all gone, even if you are feeling better after a few days.  Follow-up care  Follow up with your healthcare provider in the next 2 to 3 days, or as advised. This is to be sure the medicine is helping you get better.  If you are 65 or older, you should get a pneumococcal vaccine and a yearly flu (influenza) shot. You should also get these vaccines if you have chronic lung disease like asthma, emphysema, or COPD. Recently, a second type of pneumonia vaccine has become available for everyone over 65 years old. This is in addition to the previous vaccine. Ask your provider about this.  When to seek medical advice  Call your healthcare provider right away if any of these occur:  · You dont get better within the first 48 hours of treatment  · Shortness of breath gets worse  · Rapid breathing (more than 25 breaths per minute)  · Coughing up blood  · Chest  pain gets worse with breathing  · Fever of 100.4°F (38°C) or higher that doesnt get better with fever medicine  · Weakness, dizziness, or fainting that gets worse  · Thirst or dry mouth that gets worse  · Sinus pain, headache, or a stiff neck  · Chest pain not caused by coughing  Date Last Reviewed: 1/1/2017  © 1463-9523 [employer], [employer]. [address] [place] [address]. All rights reserved. This information is not intended as a substitute for professional medical care. Always follow your healthcare professional's instructions.

## 2020-01-31 ENCOUNTER — TELEPHONE (OUTPATIENT)
Dept: URGENT CARE | Facility: CLINIC | Age: 65
End: 2020-01-31

## 2020-01-31 ENCOUNTER — HOSPITAL ENCOUNTER (OUTPATIENT)
Dept: RADIOLOGY | Facility: HOSPITAL | Age: 65
Discharge: HOME | End: 2020-01-31
Attending: PHYSICIAN ASSISTANT
Payer: COMMERCIAL

## 2020-01-31 DIAGNOSIS — J18.9 PNEUMONIA OF RIGHT LOWER LOBE DUE TO INFECTIOUS ORGANISM: ICD-10-CM

## 2020-01-31 DIAGNOSIS — R93.89 ABNORMAL CT OF THE CHEST: Primary | ICD-10-CM

## 2020-01-31 DIAGNOSIS — Z87.01 HISTORY OF RECENT PNEUMONIA: ICD-10-CM

## 2020-01-31 PROCEDURE — 71250 CT THORAX DX C-: CPT | Mod: 26,,, | Performed by: SPECIALIST

## 2020-01-31 PROCEDURE — 71250 CT CHEST WITHOUT CONTRAST: ICD-10-PCS | Mod: 26,,, | Performed by: SPECIALIST

## 2020-01-31 PROCEDURE — 71250 CT THORAX DX C-: CPT | Mod: TC

## 2020-01-31 NOTE — TELEPHONE ENCOUNTER
Spoke to patient regarding his abnormal chest CT. Patient was seen and treated for RLL PNA with azithromycin, doxycycline, and rocephin injection without resolution. He was seen subsequently for repeat CXR after resolution of symptoms. Referral to pulmonology ordered. Patient given the number for the scheduling center and instructed to f/u within the next week. Patient verbalized understanding.

## 2020-01-31 NOTE — PATIENT INSTRUCTIONS
CT was scheduled for tomorrow. Please go to appointment as scheduled.     We will call you tomorrow with the results.    Please follow up with your PCP in 48-72 hours.     If your condition worsens or fails to improve we recommend that you receive another evaluation at the ER immediately or contact your PCP to discuss your concerns or return here. You must understand that you've received an urgent care treatment only and that you may be released before all your medical problems are known or treated. You the patient will arrange for followup care as instructed.             Pneumonia (Adult)  Pneumonia is an infection deep within the lungs. It is in the small air sacs (alveoli). Pneumonia may be caused by a virus or bacteria. Pneumonia caused by bacteria is usually treated with an antibiotic. Severe cases may need to be treated in the hospital. Milder cases can be treated at home. Symptoms usually start to get better during the first 2 days of treatment.    Home care  Follow these guidelines when caring for yourself at home:  · Rest at home for the first 2 to 3 days, or until you feel stronger. Dont let yourself get overly tired when you go back to your activities.  · Stay away from cigarette smoke - yours or other peoples.  · You may use acetaminophen or ibuprofen to control fever or pain, unless another medicine was prescribed. If you have chronic liver or kidney disease, talk with your healthcare provider before using these medicines. Also talk with your provider if youve had a stomach ulcer or gastrointestinal bleeding. Dont give aspirin to anyone younger than 18 years of age who is ill with a fever. It may cause severe liver damage.  · Your appetite may be poor, so a light diet is fine.  · Drink 6 to 8 glasses of fluids every day to make sure you are getting enough fluids. Beverages can include water, sport drinks, sodas without caffeine, juices, tea, or soup. Fluids will help loosen secretions in the lung.  This will make it easier for you to cough up the phlegm (sputum). If you also have heart or kidney disease, check with your healthcare provider before you drink extra fluids.  · Take antibiotic medicine prescribed until it is all gone, even if you are feeling better after a few days.  Follow-up care  Follow up with your healthcare provider in the next 2 to 3 days, or as advised. This is to be sure the medicine is helping you get better.  If you are 65 or older, you should get a pneumococcal vaccine and a yearly flu (influenza) shot. You should also get these vaccines if you have chronic lung disease like asthma, emphysema, or COPD. Recently, a second type of pneumonia vaccine has become available for everyone over 65 years old. This is in addition to the previous vaccine. Ask your provider about this.  When to seek medical advice  Call your healthcare provider right away if any of these occur:  · You dont get better within the first 48 hours of treatment  · Shortness of breath gets worse  · Rapid breathing (more than 25 breaths per minute)  · Coughing up blood  · Chest pain gets worse with breathing  · Fever of 100.4°F (38°C) or higher that doesnt get better with fever medicine  · Weakness, dizziness, or fainting that gets worse  · Thirst or dry mouth that gets worse  · Sinus pain, headache, or a stiff neck  · Chest pain not caused by coughing  Date Last Reviewed: 1/1/2017  © 7123-1804 [employer], [employer]. [address] [place] [id number]. All rights reserved. This information is not intended as a substitute for professional medical care. Always follow your healthcare professional's instructions.

## 2020-02-10 ENCOUNTER — OFFICE VISIT (OUTPATIENT)
Dept: PULMONOLOGY | Facility: CLINIC | Age: 65
End: 2020-02-10
Payer: COMMERCIAL

## 2020-02-10 VITALS
HEIGHT: 72 IN | SYSTOLIC BLOOD PRESSURE: 140 MMHG | HEART RATE: 83 BPM | BODY MASS INDEX: 26.69 KG/M2 | WEIGHT: 197.06 LBS | OXYGEN SATURATION: 98 % | DIASTOLIC BLOOD PRESSURE: 82 MMHG

## 2020-02-10 DIAGNOSIS — R93.89 ABNORMAL CT SCAN: Primary | ICD-10-CM

## 2020-02-10 DIAGNOSIS — J98.4 CAVITARY LESION OF LUNG: ICD-10-CM

## 2020-02-10 PROCEDURE — 99999 PR PBB SHADOW E&M-EST. PATIENT-LVL IV: CPT | Mod: PBBFAC,,, | Performed by: NURSE PRACTITIONER

## 2020-02-10 PROCEDURE — 3008F PR BODY MASS INDEX (BMI) DOCUMENTED: ICD-10-PCS | Mod: CPTII,S$GLB,, | Performed by: NURSE PRACTITIONER

## 2020-02-10 PROCEDURE — 99204 PR OFFICE/OUTPT VISIT, NEW, LEVL IV, 45-59 MIN: ICD-10-PCS | Mod: S$GLB,,, | Performed by: NURSE PRACTITIONER

## 2020-02-10 PROCEDURE — 99999 PR PBB SHADOW E&M-EST. PATIENT-LVL IV: ICD-10-PCS | Mod: PBBFAC,,, | Performed by: NURSE PRACTITIONER

## 2020-02-10 PROCEDURE — 3008F BODY MASS INDEX DOCD: CPT | Mod: CPTII,S$GLB,, | Performed by: NURSE PRACTITIONER

## 2020-02-10 PROCEDURE — 99204 OFFICE O/P NEW MOD 45 MIN: CPT | Mod: S$GLB,,, | Performed by: NURSE PRACTITIONER

## 2020-02-10 NOTE — PROGRESS NOTES
Subjective:       Patient ID: Angel Durán Jr. is a 64 y.o. male.    Chief Complaint: Abnormal Ct Scan    HPI:   Angel Durán Jr. is a 64 y.o. male who presents with evaluation for an abnormal CT  Had an episode of hemoptysis in early December 2019, went to ED, told he had PNA. Had a little cough, went to , was told it was sinus issues and got a nose spray and an inhaler.  Then saw NP Mayorga at [facility] and was told it was PNA.  Never smoker, no lung problems personally.  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   Has a history of seizures, no recent incidents, no dysphagia or choking.    Review of Systems   Constitutional: Positive for weight loss (8lbs). Negative for fever, chills, activity change, fatigue and night sweats.   HENT: Negative for postnasal drip, rhinorrhea, trouble swallowing and congestion.    Eyes: Negative for itching.   Respiratory: Positive for hemoptysis (on episode in late Novemeber, currently appears to be small spots.). Negative for cough, sputum production, choking, chest tightness, shortness of breath, wheezing, dyspnea on extertion and use of rescue inhaler.    Cardiovascular: Negative for chest pain and palpitations.   Genitourinary: Negative for difficulty urinating.   Endocrine: Negative for cold intolerance and heat intolerance.    Musculoskeletal: Negative for arthralgias.   Skin: Negative for rash.   Gastrointestinal: Negative for nausea, vomiting and acid reflux.   Neurological: Negative for dizziness and light-headedness.   Hematological: Negative for adenopathy.   Psychiatric/Behavioral: Negative for sleep disturbance.         Social History     Tobacco Use    Smoking status: Never Smoker    Smokeless tobacco: Never Used   Substance Use Topics    Alcohol use: No       Review of patient's allergies indicates:  No Known Allergies  Past Medical History:   Diagnosis Date    Seizures      Past Surgical History:   Procedure Laterality Date    EXCISION OF  LESION OF SCROTUM Right 8/13/2019    Procedure: EXCISION, LESION, SCROTUM;  Surgeon: Pat Garrett MD;  Location: [facility];  Service: Urology;  Laterality: Right;  RN PRE OP 8-6-19    KNEE SURGERY  1990    Lt knee scope     Current Outpatient Medications on File Prior to Visit   Medication Sig    ascorbic acid, vitamin C, (VITAMIN C) 500 MG tablet Take 500 mg by mouth once daily.    PHENobarbital (LUMINAL) 32.4 MG tablet TK 3 TS PO QD    phenobarbital in dextrose 5 % infusion phenobarbital   30MG----TAKE THREE DAILY    phenytoin (DILANTIN EXTENDED) 100 MG ER capsule Take 100 tablets by mouth 2 (two) times daily. 1 in AM, 2 in PM    phenytoin (DILANTIN) 30 MG ER capsule Take 30 tablets by mouth every morning.     albuterol (PROVENTIL/VENTOLIN HFA) 90 mcg/actuation inhaler Inhale 2 puffs into the lungs every 4 (four) hours as needed for Wheezing or Shortness of Breath. Rescue (Patient not taking: Reported on 2/10/2020)    azithromycin (Z-RODRIGO) 250 MG tablet Take 2 tablets by mouth on day 1; Take 1 tablet by mouth on days 2-5 (Patient not taking: Reported on 1/20/2020)     No current facility-administered medications on file prior to visit.        Objective:      Vitals:    02/10/20 0900   BP: (!) 140/82   Pulse: 83     Physical Exam   Constitutional: He is oriented to person, place, and time. He appears well-developed and well-nourished. No distress.   HENT:   Head: Normocephalic.   Neck: Normal range of motion. Neck supple.   Cardiovascular: Normal rate and regular rhythm.   No murmur heard.  Pulmonary/Chest: Normal expansion, symmetric chest wall expansion, effort normal and breath sounds normal. No respiratory distress. He has no decreased breath sounds. He has no wheezes. He has no rhonchi. He has no rales.   Abdominal: Soft. He exhibits no distension. There is no hepatosplenomegaly. There is no tenderness.   Musculoskeletal: Normal range of motion. He exhibits no edema.   Lymphadenopathy:     He has  no cervical adenopathy.   Neurological: He is alert and oriented to person, place, and time. Gait normal.   Skin: Skin is warm and dry. No cyanosis. Nails show no clubbing.   Psychiatric: He has a normal mood and affect.   Vitals reviewed.    Personal Diagnostic Review  Imaging personally reviewed with patient      Pulmonary Function Tests 2/10/2020   SpO2 98   Height 72.000   Weight 3153.46   BMI (Calculated) 26.7   Some recent data might be hidden         Assessment:     Problem List Items Addressed This Visit        Pulmonary    Cavitary lesion of lung    Overview     Right lower lung oval component that appears to represent a cavitary lesion, measuring approximately 3.5 x 2 cm     Refer to IR  Resp cx and AFB in interim         Current Assessment & Plan     Lower suspicion for malignancy but would prefer to obtain more information about nodule.  No constitutional symptoms reported.            Other Visit Diagnoses     Abnormal CT scan    -  Primary    Relevant Orders    Culture, Respiratory with Gram Stain    AFB Culture & Smear    Ambulatory referral/consult  to [facility] Interventional RAD    IR Biopsy Lung

## 2020-02-10 NOTE — PATIENT INSTRUCTIONS
Get your sputum culture, I will contact you with the results as they come in.  They can take several weeks to result.    I will discuss your case with my staff doctor and contact you to discuss our next steps

## 2020-02-11 ENCOUNTER — LAB VISIT (OUTPATIENT)
Dept: LAB | Facility: HOSPITAL | Age: 65
End: 2020-02-11
Attending: NURSE PRACTITIONER
Payer: COMMERCIAL

## 2020-02-11 DIAGNOSIS — R93.89 ABNORMAL CT SCAN: ICD-10-CM

## 2020-02-11 PROCEDURE — 87015 SPECIMEN INFECT AGNT CONCNTJ: CPT

## 2020-02-11 PROCEDURE — 87070 CULTURE OTHR SPECIMN AEROBIC: CPT

## 2020-02-11 PROCEDURE — 87205 SMEAR GRAM STAIN: CPT

## 2020-02-11 PROCEDURE — 87206 SMEAR FLUORESCENT/ACID STAI: CPT

## 2020-02-11 PROCEDURE — 87116 MYCOBACTERIA CULTURE: CPT

## 2020-02-11 NOTE — ASSESSMENT & PLAN NOTE
Lower suspicion for malignancy but would prefer to obtain more information about nodule.  No constitutional symptoms reported.

## 2020-02-13 LAB
BACTERIA SPEC AEROBE CULT: NORMAL
GRAM STN SPEC: NORMAL

## 2020-02-17 ENCOUNTER — PATIENT MESSAGE (OUTPATIENT)
Dept: PULMONOLOGY | Facility: CLINIC | Age: 65
End: 2020-02-17

## 2020-02-17 NOTE — TELEPHONE ENCOUNTER
Left message on patient voicemail, informing him that I have received his message and will forward to TERESA Best to review/advise. I also advised patient that if he has any questions or concerns, he may contact the office. Office number has been provided.

## 2020-02-19 ENCOUNTER — OFFICE VISIT (OUTPATIENT)
Dept: INTERVENTIONAL RADIOLOGY/VASCULAR | Facility: CLINIC | Age: 65
End: 2020-02-19
Payer: COMMERCIAL

## 2020-02-19 ENCOUNTER — LAB VISIT (OUTPATIENT)
Dept: LAB | Facility: HOSPITAL | Age: 65
End: 2020-02-19
Payer: COMMERCIAL

## 2020-02-19 VITALS
SYSTOLIC BLOOD PRESSURE: 156 MMHG | HEART RATE: 68 BPM | BODY MASS INDEX: 26.94 KG/M2 | DIASTOLIC BLOOD PRESSURE: 91 MMHG | HEIGHT: 72 IN | WEIGHT: 198.88 LBS

## 2020-02-19 DIAGNOSIS — J98.4 CAVITARY LESION OF LUNG: ICD-10-CM

## 2020-02-19 DIAGNOSIS — J98.4 CAVITARY LESION OF LUNG: Primary | ICD-10-CM

## 2020-02-19 LAB
BASOPHILS # BLD AUTO: 0.06 K/UL (ref 0–0.2)
BASOPHILS NFR BLD: 0.7 % (ref 0–1.9)
DIFFERENTIAL METHOD: ABNORMAL
EOSINOPHIL # BLD AUTO: 0.1 K/UL (ref 0–0.5)
EOSINOPHIL NFR BLD: 1.1 % (ref 0–8)
ERYTHROCYTE [DISTWIDTH] IN BLOOD BY AUTOMATED COUNT: 13.5 % (ref 11.5–14.5)
HCT VFR BLD AUTO: 45.9 % (ref 40–54)
HGB BLD-MCNC: 15.2 G/DL (ref 14–18)
IMM GRANULOCYTES # BLD AUTO: 0.03 K/UL (ref 0–0.04)
IMM GRANULOCYTES NFR BLD AUTO: 0.3 % (ref 0–0.5)
INR PPP: 1.1 (ref 0.8–1.2)
LYMPHOCYTES # BLD AUTO: 1.4 K/UL (ref 1–4.8)
LYMPHOCYTES NFR BLD: 15.7 % (ref 18–48)
MCH RBC QN AUTO: 30.5 PG (ref 27–31)
MCHC RBC AUTO-ENTMCNC: 33.1 G/DL (ref 32–36)
MCV RBC AUTO: 92 FL (ref 82–98)
MONOCYTES # BLD AUTO: 0.7 K/UL (ref 0.3–1)
MONOCYTES NFR BLD: 7.5 % (ref 4–15)
NEUTROPHILS # BLD AUTO: 6.7 K/UL (ref 1.8–7.7)
NEUTROPHILS NFR BLD: 74.7 % (ref 38–73)
NRBC BLD-RTO: 0 /100 WBC
PLATELET # BLD AUTO: 222 K/UL (ref 150–350)
PMV BLD AUTO: 9.2 FL (ref 9.2–12.9)
PROTHROMBIN TIME: 11.1 SEC (ref 9–12.5)
RBC # BLD AUTO: 4.98 M/UL (ref 4.6–6.2)
WBC # BLD AUTO: 8.97 K/UL (ref 3.9–12.7)

## 2020-02-19 PROCEDURE — 85025 COMPLETE CBC W/AUTO DIFF WBC: CPT

## 2020-02-19 PROCEDURE — 99999 PR PBB SHADOW E&M-EST. PATIENT-LVL III: CPT | Mod: PBBFAC,,, | Performed by: FAMILY MEDICINE

## 2020-02-19 PROCEDURE — 99999 PR PBB SHADOW E&M-EST. PATIENT-LVL III: ICD-10-PCS | Mod: PBBFAC,,, | Performed by: FAMILY MEDICINE

## 2020-02-19 PROCEDURE — 99244 PR OFFICE CONSULTATION,LEVEL IV: ICD-10-PCS | Mod: S$GLB,,, | Performed by: FAMILY MEDICINE

## 2020-02-19 PROCEDURE — 36415 COLL VENOUS BLD VENIPUNCTURE: CPT

## 2020-02-19 PROCEDURE — 99244 OFF/OP CNSLTJ NEW/EST MOD 40: CPT | Mod: S$GLB,,, | Performed by: FAMILY MEDICINE

## 2020-02-19 PROCEDURE — 85610 PROTHROMBIN TIME: CPT

## 2020-02-19 NOTE — PROGRESS NOTES
"Subjective:       Patient ID: Angel Durán Jr. is a 64 y.o. male.    Chief Complaint: Lesion    Patient referred to Interventional Radiology by  LAUREN Duncan DNP for evaluation of a right lower lobe pulmonary nodule. He tells me in December 2019 he went to the emergency room because he started cough and coughed up blood. A chest xray was done and he was prescribed antibiotics for pneumonia. He went to an urgent care a few weeks later because his cough persisted and was diagnosed with sinusitis. A CT scan was obtained on 1/31/2020 and noted "Cavitary lesion involving the right lower lobe posteriorly as detailed above appears to demonstrate air-fluid level and is thought to communicate with small adjacent bronchi.  Although the possibility of malignancy would be in the differential, the findings may relate to an infectious/inflammatory process potentially chronic or acute on chronic." He continue to have complaints of a cough. He tells me it is worse in the mornings and when he first lies down. He is accompanied by his wife.     Review of Systems   Constitutional: Positive for appetite change (attributes to anxiety over lesion). Negative for activity change, chills, fatigue and fever.   HENT: Negative for congestion, drooling, ear discharge, postnasal drip, sneezing and trouble swallowing.    Eyes: Negative for pain, discharge, redness and itching.   Respiratory: Positive for cough. Negative for shortness of breath, wheezing and stridor.    Cardiovascular: Negative for chest pain, palpitations and leg swelling.   Gastrointestinal: Negative for abdominal distention, abdominal pain, constipation, diarrhea, nausea and vomiting.   Genitourinary: Negative for difficulty urinating, dysuria, frequency and urgency.   Musculoskeletal: Negative for arthralgias, back pain, gait problem, joint swelling, myalgias and neck pain.   Skin: Negative for color change, pallor and rash.   Neurological: Negative for dizziness, weakness and " headaches.       Objective:      Physical Exam   Constitutional: He is oriented to person, place, and time. He appears well-developed and well-nourished. No distress.   Cardiovascular: Normal rate, regular rhythm and normal heart sounds. Exam reveals no gallop and no friction rub.   No murmur heard.  Pulmonary/Chest: Effort normal and breath sounds normal. No stridor. No respiratory distress. He has no wheezes. He has no rales.   Abdominal: Soft. Bowel sounds are normal. He exhibits no distension and no mass. There is no tenderness. There is no guarding.   Neurological: He is alert and oriented to person, place, and time.   Skin: Skin is warm and dry. He is not diaphoretic.   Psychiatric: He has a normal mood and affect.   Vitals reviewed.      Imaging    Assessment:       1. Cavitary lesion of lung        Plan:         Discussed case with Dr. Gautam. Reviewed images with patient and pointed out target lesion. Explained can offer biopsy of right lower lung lesion. Discussed how the procedure will be performed, risks (including, but not limited to, pain, bleeding, infection, damage to nearby structures, Potential for development of pneumothorax and subsequent chest tube placement, and the need for additional procedures), benefits, possible complications, pre-post procedure expectations, and alternatives. The patient voices understanding and all questions have been answered.  The patient agrees to proceed as planned. Patient scheduled for 3/3/2020. Pre-procedure handout with clinic phone number provided. Patient will have pre-procedure labs drawn today.

## 2020-02-19 NOTE — LETTER
February 20, 2020      Estephania Duncan, DNP  [address]  [facility] [address]           [facility] Interventional Rad  [address]  [facility] [address]  Phone: [phone]          Patient: Angel Durán Jr.   MR Number: [id number]   YOB: 1955   Date of Visit: 2/19/2020       Dear Estephania Duncan:    Thank you for referring Angel Durán to me for evaluation. Attached you will find relevant portions of my assessment and plan of care.    If you have questions, please do not hesitate to call me. I look forward to following Angel Durán along with you.    Sincerely,    Gabrielle Berry, NP    Enclosure  CC:  No Recipients    If you would like to receive this communication electronically, please contact [email or web address] or [phone] to request more information on [employer] access.    For providers and/or their staff who would like to refer a patient to [facility], please contact us through our one-stop-shop provider referral line, [facility] , at [phone].    If you feel you have received this communication in error or would no longer like to receive these types of communications, please e-mail [email or web address]

## 2020-02-19 NOTE — LETTER
February 19, 2020    Angel REED Luis Armando Rivas  [address] [facility] [address]     [facility] Interventional Rad  [address]  [facility] [address]  Phone: [phone] PRE-PROCEDURE INSTRUCTIONS    Your procedure with Interventional Radiology is scheduled for 3/3/2020. Please arrive by 9:30am.    You must check-in and receive a wristband before going to your procedure. Your check-in location is [facility].    **Do not eat or drink anything between midnight and the time of your procedure. This includes gum, mints, and candy lemon drops.    **Do not smoke or drink alcoholic beverages 24 hours prior to your procedure.    **If you wear contact lenses, dentures, hearing aids, or glasses, bring a container to put them in during the procedure and give them to a family member for safekeeping.    **If you have been diagnosed with sleep apnea please bring your CPAP machine.    **If your doctor has scheduled you for an overnight stay, bring a small overnight bag with any personal items that you may need.    **Make arrangements in advance for transportation home by a responsible adult. It is not safe to drive a vehicle during the 24 hours following the procedure.    **All [facility] facilities and properties are tobacco free. Smoking is NOT allowed.    PLEASE NOTE: The procedure schedule has many variables which affect the time of your procedure. Family members should be available if your surgery time changes.    If you have any questions about these instructions call Interventional Radiology at [phone] Monday - Friday between 8:00am and 4:00pm or [phone] for after hours.

## 2020-02-19 NOTE — Clinical Note
"Thank you for referring Mr. Durán to Interventional Radiology at the [facility]. Please don't hesitate to contact us if there are any questions regarding this evaluation at [phone]. If you have any other patients for whom you would like a consultation, please place an order for "Amb consult to [facility] Interventional Rad", and we will be happy to review their case.Sincerely,BENJI Sanchez, FNPInterventional Radiology"

## 2020-02-20 ENCOUNTER — PATIENT MESSAGE (OUTPATIENT)
Dept: PULMONOLOGY | Facility: CLINIC | Age: 65
End: 2020-02-20

## 2020-02-20 NOTE — TELEPHONE ENCOUNTER
Left patient message informing him that I have received his message and will forward to TERESA Best to review/advise. I also advised patient that if he has any questions or concerns, he may contact the office. Office number has been provided.

## 2020-02-26 DIAGNOSIS — R93.89 ABNORMAL CT SCAN: Primary | ICD-10-CM

## 2020-02-27 ENCOUNTER — PATIENT MESSAGE (OUTPATIENT)
Dept: PULMONOLOGY | Facility: CLINIC | Age: 65
End: 2020-02-27

## 2020-02-28 ENCOUNTER — PATIENT MESSAGE (OUTPATIENT)
Dept: PULMONOLOGY | Facility: CLINIC | Age: 65
End: 2020-02-28

## 2020-03-02 RX ORDER — FENTANYL CITRATE 50 UG/ML
50 INJECTION, SOLUTION INTRAMUSCULAR; INTRAVENOUS
Status: CANCELLED | OUTPATIENT
Start: 2020-03-02

## 2020-03-02 RX ORDER — MIDAZOLAM HYDROCHLORIDE 1 MG/ML
1 INJECTION INTRAMUSCULAR; INTRAVENOUS
Status: CANCELLED | OUTPATIENT
Start: 2020-03-02

## 2020-03-03 ENCOUNTER — HOSPITAL ENCOUNTER (OUTPATIENT)
Facility: HOSPITAL | Age: 65
Discharge: HOME | End: 2020-03-03
Attending: RADIOLOGY | Admitting: RADIOLOGY
Payer: COMMERCIAL

## 2020-03-03 VITALS
HEIGHT: 72 IN | DIASTOLIC BLOOD PRESSURE: 75 MMHG | SYSTOLIC BLOOD PRESSURE: 133 MMHG | RESPIRATION RATE: 18 BRPM | HEART RATE: 65 BPM | BODY MASS INDEX: 26.94 KG/M2 | WEIGHT: 198.88 LBS | TEMPERATURE: 98 F | OXYGEN SATURATION: 98 %

## 2020-03-03 DIAGNOSIS — R93.89 ABNORMAL CT SCAN: ICD-10-CM

## 2020-03-03 PROCEDURE — 87075 CULTR BACTERIA EXCEPT BLOOD: CPT

## 2020-03-03 PROCEDURE — 88333 PR  INTRAOPERATIVE CYTO PATH CONSULT, INITIAL SITE: ICD-10-PCS | Mod: 26,,, | Performed by: PATHOLOGY

## 2020-03-03 PROCEDURE — 63600175 PHARM REV CODE 636 W HCPCS: Performed by: RADIOLOGY

## 2020-03-03 PROCEDURE — 87076 CULTURE ANAEROBE IDENT EACH: CPT

## 2020-03-03 PROCEDURE — 88333 PATH CONSLTJ SURG CYTO XM 1: CPT | Mod: 26,,, | Performed by: PATHOLOGY

## 2020-03-03 PROCEDURE — 87070 CULTURE OTHR SPECIMN AEROBIC: CPT

## 2020-03-03 PROCEDURE — 88305 TISSUE EXAM BY PATHOLOGIST: ICD-10-PCS | Mod: 26,,, | Performed by: PATHOLOGY

## 2020-03-03 PROCEDURE — 88305 TISSUE EXAM BY PATHOLOGIST: CPT | Performed by: PATHOLOGY

## 2020-03-03 PROCEDURE — 25000003 PHARM REV CODE 250: Performed by: RADIOLOGY

## 2020-03-03 PROCEDURE — 87206 SMEAR FLUORESCENT/ACID STAI: CPT

## 2020-03-03 PROCEDURE — 87102 FUNGUS ISOLATION CULTURE: CPT

## 2020-03-03 PROCEDURE — 88305 TISSUE EXAM BY PATHOLOGIST: CPT | Mod: 26,,, | Performed by: PATHOLOGY

## 2020-03-03 PROCEDURE — 87116 MYCOBACTERIA CULTURE: CPT

## 2020-03-03 PROCEDURE — 63600175 PHARM REV CODE 636 W HCPCS: Performed by: FAMILY MEDICINE

## 2020-03-03 PROCEDURE — 88333 PATH CONSLTJ SURG CYTO XM 1: CPT | Mod: 59 | Performed by: PATHOLOGY

## 2020-03-03 RX ORDER — MIDAZOLAM HYDROCHLORIDE 1 MG/ML
INJECTION INTRAMUSCULAR; INTRAVENOUS CODE/TRAUMA/SEDATION MEDICATION
Status: COMPLETED | OUTPATIENT
Start: 2020-03-03 | End: 2020-03-03

## 2020-03-03 RX ORDER — SODIUM CHLORIDE 9 MG/ML
500 INJECTION, SOLUTION INTRAVENOUS ONCE
Status: COMPLETED | OUTPATIENT
Start: 2020-03-03 | End: 2020-03-03

## 2020-03-03 RX ORDER — FENTANYL CITRATE 50 UG/ML
INJECTION, SOLUTION INTRAMUSCULAR; INTRAVENOUS CODE/TRAUMA/SEDATION MEDICATION
Status: COMPLETED | OUTPATIENT
Start: 2020-03-03 | End: 2020-03-03

## 2020-03-03 RX ORDER — LIDOCAINE HYDROCHLORIDE 10 MG/ML
1 INJECTION INFILTRATION; PERINEURAL ONCE
Status: COMPLETED | OUTPATIENT
Start: 2020-03-03 | End: 2020-03-03

## 2020-03-03 RX ADMIN — LIDOCAINE HYDROCHLORIDE 0.1 ML: 10 INJECTION, SOLUTION EPIDURAL; INFILTRATION; INTRACAUDAL; PERINEURAL at 10:03

## 2020-03-03 RX ADMIN — MIDAZOLAM HYDROCHLORIDE 1 MG: 1 INJECTION, SOLUTION INTRAMUSCULAR; INTRAVENOUS at 12:03

## 2020-03-03 RX ADMIN — FENTANYL CITRATE 50 MCG: 50 INJECTION, SOLUTION INTRAMUSCULAR; INTRAVENOUS at 01:03

## 2020-03-03 RX ADMIN — FENTANYL CITRATE 50 MCG: 50 INJECTION, SOLUTION INTRAMUSCULAR; INTRAVENOUS at 12:03

## 2020-03-03 RX ADMIN — MIDAZOLAM HYDROCHLORIDE 1 MG: 1 INJECTION, SOLUTION INTRAMUSCULAR; INTRAVENOUS at 01:03

## 2020-03-03 RX ADMIN — SODIUM CHLORIDE 500 ML: 0.9 INJECTION, SOLUTION INTRAVENOUS at 10:03

## 2020-03-03 NOTE — NURSING
First xray read by MD Alberts & WNL, pt transitioned to RA sats 96%, pt denies any pain or SOB, repeat 2nd xray @ 3065, family @ bs

## 2020-03-03 NOTE — H&P
Radiology History & Physical      SUBJECTIVE:     Chief Complaint: cavitary right lower lobe lesion.     History of Present Illness:  Angel Durán Jr. is a 64 y.o. male who presents for cavitary right lower lobe lesion ct guided biopsy.    Past Medical History:   Diagnosis Date    Seizures      Past Surgical History:   Procedure Laterality Date    EXCISION OF LESION OF SCROTUM Right 8/13/2019    Procedure: EXCISION, LESION, SCROTUM;  Surgeon: Pat Garrett MD;  Location: [facility];  Service: Urology;  Laterality: Right;  RN PRE OP 8-6-19    KNEE SURGERY  1990    Lt knee scope       Home Meds:   Prior to Admission medications    Medication Sig Start Date End Date Taking? Authorizing Provider   albuterol (PROVENTIL/VENTOLIN HFA) 90 mcg/actuation inhaler Inhale 2 puffs into the lungs every 4 (four) hours as needed for Wheezing or Shortness of Breath. Rescue  Patient not taking: Reported on 2/10/2020 1/20/20   Desiree Brewer PA-C   ascorbic acid, vitamin C, (VITAMIN C) 500 MG tablet Take 500 mg by mouth once daily.    Historical Provider, MD   azithromycin (Z-RODRIGO) 250 MG tablet Take 2 tablets by mouth on day 1; Take 1 tablet by mouth on days 2-5  Patient not taking: Reported on 1/20/2020 12/6/19   Aníbal Escamilla PA-C   PHENobarbital (LUMINAL) 32.4 MG tablet TK 3 TS PO QD 8/26/19   Historical Provider, MD   phenobarbital in dextrose 5 % infusion phenobarbital   30MG----TAKE THREE DAILY    Historical Provider, MD   phenytoin (DILANTIN EXTENDED) 100 MG ER capsule Take 100 tablets by mouth 2 (two) times daily. 1 in AM, 2 in PM    Historical Provider, MD   phenytoin (DILANTIN) 30 MG ER capsule Take 30 tablets by mouth every morning.     Historical Provider, MD     Anticoagulants/Antiplatelets: no anticoagulation    Allergies: Review of patient's allergies indicates:  No Known Allergies  Sedation History:  no adverse reactions    Review of Systems:   Hematological: no known coagulopathies  Respiratory: no  shortness of breath  Cardiovascular: no chest pain  Gastrointestinal: no abdominal pain  Genito-Urinary: no dysuria  Musculoskeletal: negative  Neurological: no TIA or stroke symptoms         OBJECTIVE:     Vital Signs (Most Recent)       Physical Exam:  ASA: 3  Mallampati: 2    General: no acute distress  Mental Status: alert and oriented to person, place and time  HEENT: normocephalic, atraumatic  Chest: unlabored breathing  Heart: regular heart rate  Abdomen: nondistended  Extremity: moves all extremities    Laboratory  Lab Results   Component Value Date    INR 1.1 02/19/2020       Lab Results   Component Value Date    WBC 8.97 02/19/2020    HGB 15.2 02/19/2020    HCT 45.9 02/19/2020    MCV 92 02/19/2020     02/19/2020      Lab Results   Component Value Date     (H) 08/06/2019     08/06/2019    K 4.1 08/06/2019     08/06/2019    CO2 26 08/06/2019    BUN 16 08/06/2019    CREATININE 1.1 08/06/2019    CALCIUM 9.1 08/06/2019       ASSESSMENT/PLAN:     Sedation Plan: moderate.  Patient will undergo  cavitary right lower lobe lesion ct guided biopsy.    ELIDIA MENDEZ  PGY-V Radiology Resident  [facility] [facility]  Pager: [phone]

## 2020-03-03 NOTE — NURSING
2nd xray read by MD Dozier and WNL, pt may be dc home with wife, dressing CDI, no bleeding, no hematoma, pt denies any SOB or pain, dc instructions given, piv dcd, pt left via WC with wife & escort in NAD, MD Melgoza at  to discuss case with pt

## 2020-03-03 NOTE — PROGRESS NOTES
Patient assigned to this writer by charge nurse. The patient is in the waiting room but, will be escorted to [facility] room 3. This writer is completing a chart review and reviewing MD orders.

## 2020-03-03 NOTE — PROGRESS NOTES
Procedure complete, pt tolerated well. Site clean, dry, intact, no bleeding, no hematoma. Pt to recover in [facility] before discharge in care of RN. Report to be given to ROCU RN at bedside.  CXR in one and tw hours from now, at 1425 and 1525 per Dr. Melgoza

## 2020-03-03 NOTE — DISCHARGE INSTRUCTIONS
"For scheduling: Call Gabi at [phone]      After hours and weekends: Call [phone] and ask for "Radiology Resident on Call'    For immediate concerns that are not emergent, you may call our radiology clinic at [phone]  "

## 2020-03-04 LAB
ADEQUACY: NORMAL
FINAL PATHOLOGIC DIAGNOSIS: NORMAL
GROSS: NORMAL

## 2020-03-04 NOTE — DISCHARGE SUMMARY
Radiology Discharge Summary      Hospital Course: No complications    Admit Date: 3/3/2020  Discharge Date: 03/03/2020     Instructions Given to Patient: Yes  Diet: Resume prior diet  Activity: activity as tolerated and no driving for today    Description of Condition on Discharge: Stable  Vital Signs (Most Recent): Temp: 98.1 °F (36.7 °C) (03/03/20 1330)  Pulse: 65 (03/03/20 1530)  Resp: 18 (03/03/20 1530)  BP: 133/75 (03/03/20 1530)  SpO2: 98 % (03/03/20 1530)    Discharge Disposition: Home    Discharge Diagnosis: Right lung mass s/p biopsy. Follow up as scheduled.    Jd Melgoza M.D.  Diagnostic and Interventional Radiologist  Department of Radiology  Pager: [phone]

## 2020-03-04 NOTE — PROCEDURES
Radiology Post-Procedure Note    Pre Op Diagnosis: Right lung mass    Post Op Diagnosis: Right lung mass    Procedure: right lung biopsy    Procedure performed by: Jd Melgoza MD    Written Informed Consent Obtained: Yes    Specimen Removed: YES 12 x 18 gauge cores    Estimated Blood Loss: Minimal    Findings:   Using CT guidance a 17g sheath needle was placed into a Right lung mass. 18g monopty biopsy gun used to take 12 core biopsy samples. Specimen sent to pathology.     Additional specimen sent to lab: Yes    Patient tolerated procedure well.    Jd Melgoza M.D.  Diagnostic and Interventional Radiologist  Department of Radiology  Pager: [phone]     70

## 2020-03-05 ENCOUNTER — TELEPHONE (OUTPATIENT)
Dept: PULMONOLOGY | Facility: CLINIC | Age: 65
End: 2020-03-05

## 2020-03-05 ENCOUNTER — PATIENT MESSAGE (OUTPATIENT)
Dept: PULMONOLOGY | Facility: CLINIC | Age: 65
End: 2020-03-05

## 2020-03-05 DIAGNOSIS — R93.89 ABNORMAL CT SCAN: Primary | ICD-10-CM

## 2020-03-06 LAB — BACTERIA SPEC AEROBE CULT: NO GROWTH

## 2020-03-09 LAB — BACTERIA SPEC ANAEROBE CULT: ABNORMAL

## 2020-03-27 ENCOUNTER — OFFICE VISIT (OUTPATIENT)
Dept: FAMILY MEDICINE | Facility: CLINIC | Age: 65
End: 2020-03-27
Payer: COMMERCIAL

## 2020-03-27 ENCOUNTER — PATIENT MESSAGE (OUTPATIENT)
Dept: FAMILY MEDICINE | Facility: CLINIC | Age: 65
End: 2020-03-27

## 2020-03-27 DIAGNOSIS — R50.9 FEVER, UNSPECIFIED FEVER CAUSE: ICD-10-CM

## 2020-03-27 DIAGNOSIS — R09.81 NASAL CONGESTION: Primary | ICD-10-CM

## 2020-03-27 PROCEDURE — 99213 PR OFFICE/OUTPT VISIT, EST, LEVL III, 20-29 MIN: ICD-10-PCS | Mod: 95,,, | Performed by: PHYSICIAN ASSISTANT

## 2020-03-27 PROCEDURE — 99213 OFFICE O/P EST LOW 20 MIN: CPT | Mod: 95,,, | Performed by: PHYSICIAN ASSISTANT

## 2020-03-27 RX ORDER — FLUTICASONE PROPIONATE 50 MCG
1 SPRAY, SUSPENSION (ML) NASAL 2 TIMES DAILY
Qty: 16 G | Refills: 1 | Status: SHIPPED | OUTPATIENT
Start: 2020-03-27 | End: 2020-04-26

## 2020-03-27 NOTE — PROGRESS NOTES
Subjective:       Patient ID: Angel Durán Jr. is a 64 y.o. male with multiple medical diagnoses as listed in the medical history and problem list that presents for Fever  .    Chief Complaint: Fever      Fever    This is a new problem. The current episode started in the past 7 days (tuesday night ). The problem has been resolved. Associated symptoms include congestion, coughing (chronic from his lung lesion no change ) and a sore throat (today worse this am ). Pertinent negatives include no ear pain, headaches or wheezing. He has tried acetaminophen (dayquil ) for the symptoms.        Review of Systems   Constitutional: Positive for fever (99.1 Tuesday , 100.1 tylenol, 100.9 wednesday ; today 98 checking throughtout the day ). Negative for chills.   HENT: Positive for congestion and sore throat (today worse this am ). Negative for ear pain, postnasal drip, rhinorrhea, sinus pressure, sinus pain and sneezing.    Eyes: Negative for photophobia, pain, discharge, redness and itching.   Respiratory: Positive for cough (chronic from his lung lesion no change ). Negative for chest tightness, shortness of breath and wheezing.    Gastrointestinal: Negative.    Musculoskeletal: Negative for myalgias.   Neurological: Negative for headaches.         PAST MEDICAL HISTORY:  Past Medical History:   Diagnosis Date    Seizures        SOCIAL HISTORY:  Social History     Socioeconomic History    Marital status:      Spouse name: Not on file    Number of children: Not on file    Years of education: Not on file    Highest education level: Not on file   Occupational History    Occupation: [employer] -    Social Needs    Financial resource strain: Not on file    Food insecurity:     Worry: Not on file     Inability: Not on file    Transportation needs:     Medical: Not on file     Non-medical: Not on file   Tobacco Use    Smoking status: Never Smoker    Smokeless tobacco: Never Used   Substance and  Sexual Activity    Alcohol use: No    Drug use: Never    Sexual activity: Yes     Partners: Female   Lifestyle    Physical activity:     Days per week: Not on file     Minutes per session: Not on file    Stress: Not on file   Relationships    Social connections:     Talks on phone: Not on file     Gets together: Not on file     Attends [employer] service: Not on file     Active member of club or organization: Not on file     Attends meetings of clubs or organizations: Not on file     Relationship status: Not on file   Other Topics Concern    Not on file   Social History Narrative    Not on file       ALLERGIES AND MEDICATIONS: updated and reviewed.  Review of patient's allergies indicates:  No Known Allergies  Current Outpatient Medications   Medication Sig Dispense Refill    ascorbic acid, vitamin C, (VITAMIN C) 500 MG tablet Take 500 mg by mouth once daily.      PHENobarbital (LUMINAL) 32.4 MG tablet   5    phenytoin (DILANTIN EXTENDED) 100 MG ER capsule Take 100 tablets by mouth 2 (two) times daily. 1 in AM, 2 in PM      phenytoin (DILANTIN) 30 MG ER capsule Take 30 mg by mouth every morning.       albuterol (PROVENTIL/VENTOLIN HFA) 90 mcg/actuation inhaler Inhale 2 puffs into the lungs every 4 (four) hours as needed for Wheezing or Shortness of Breath. Rescue (Patient not taking: Reported on 2/10/2020) 1 Inhaler 0    azithromycin (Z-RODRIGO) 250 MG tablet Take 2 tablets by mouth on day 1; Take 1 tablet by mouth on days 2-5 (Patient not taking: Reported on 1/20/2020) 6 tablet 0    fluticasone propionate (FLONASE) 50 mcg/actuation nasal spray 1 spray (50 mcg total) by Each Nostril route 2 (two) times daily. 16 g 1    phenobarbital in dextrose 5 % infusion phenobarbital   30MG----TAKE THREE DAILY       No current facility-administered medications for this visit.          Objective:   There were no vitals taken for this visit.     Physical Exam        Assessment:       1. Nasal congestion    2. Fever,  unspecified fever cause        Plan:       Nasal congestion  -     fluticasone propionate (FLONASE) 50 mcg/actuation nasal spray; 1 spray (50 mcg total) by Each Nostril route 2 (two) times daily.  Dispense: 16 g; Refill: 1  Proper technique dicussed    Fever, unspecified fever cause  Resolved  Continue to monitor    Call back for return of fever or any concerns     Consult Start Time: 03/27/2020 14:28  Consult End Time: 03/27/2020 14:39                No follow-ups on file.

## 2020-04-01 LAB — FUNGUS SPEC CULT: NORMAL

## 2020-04-15 LAB
ACID FAST MOD KINY STN SPEC: NORMAL
MYCOBACTERIUM SPEC QL CULT: NORMAL

## 2020-05-05 LAB
ACID FAST MOD KINY STN SPEC: NORMAL
MYCOBACTERIUM SPEC QL CULT: NORMAL

## 2020-06-09 ENCOUNTER — OFFICE VISIT (OUTPATIENT)
Dept: PULMONOLOGY | Facility: CLINIC | Age: 65
End: 2020-06-09
Payer: COMMERCIAL

## 2020-06-09 ENCOUNTER — HOSPITAL ENCOUNTER (OUTPATIENT)
Dept: RADIOLOGY | Facility: HOSPITAL | Age: 65
Discharge: HOME | End: 2020-06-09
Attending: NURSE PRACTITIONER
Payer: COMMERCIAL

## 2020-06-09 VITALS
DIASTOLIC BLOOD PRESSURE: 89 MMHG | SYSTOLIC BLOOD PRESSURE: 140 MMHG | HEART RATE: 68 BPM | OXYGEN SATURATION: 96 % | WEIGHT: 207.88 LBS | HEIGHT: 72 IN | BODY MASS INDEX: 28.16 KG/M2

## 2020-06-09 DIAGNOSIS — R93.89 ABNORMAL CT SCAN: ICD-10-CM

## 2020-06-09 PROCEDURE — 71250 CT THORAX DX C-: CPT | Mod: TC

## 2020-06-09 PROCEDURE — 99213 OFFICE O/P EST LOW 20 MIN: CPT | Mod: S$GLB,,, | Performed by: INTERNAL MEDICINE

## 2020-06-09 PROCEDURE — 71250 CT CHEST WITHOUT CONTRAST: ICD-10-PCS | Mod: 26,,, | Performed by: RADIOLOGY

## 2020-06-09 PROCEDURE — 71250 CT THORAX DX C-: CPT | Mod: 26,,, | Performed by: RADIOLOGY

## 2020-06-09 PROCEDURE — 3008F BODY MASS INDEX DOCD: CPT | Mod: CPTII,S$GLB,, | Performed by: INTERNAL MEDICINE

## 2020-06-09 PROCEDURE — 99999 PR PBB SHADOW E&M-EST. PATIENT-LVL III: CPT | Mod: PBBFAC,,, | Performed by: INTERNAL MEDICINE

## 2020-06-09 PROCEDURE — 3008F PR BODY MASS INDEX (BMI) DOCUMENTED: ICD-10-PCS | Mod: CPTII,S$GLB,, | Performed by: INTERNAL MEDICINE

## 2020-06-09 PROCEDURE — 99999 PR PBB SHADOW E&M-EST. PATIENT-LVL III: ICD-10-PCS | Mod: PBBFAC,,, | Performed by: INTERNAL MEDICINE

## 2020-06-09 PROCEDURE — 99213 PR OFFICE/OUTPT VISIT, EST, LEVL III, 20-29 MIN: ICD-10-PCS | Mod: S$GLB,,, | Performed by: INTERNAL MEDICINE

## 2020-06-09 NOTE — ASSESSMENT & PLAN NOTE
RLL lung lesion biopsied with fibrosis. Today's CT with considerably smaller size of RLL lesion.   -Will await final CT read.   No need for further pulmonary follow up.   This lesion is resolving pneumonia. Patient no longer has symptoms and now has gained back his weight loss.

## 2020-06-09 NOTE — PROGRESS NOTES
Subjective:       Patient ID: Angel Durán Jr. is a 64 y.o. male.    Chief Complaint: Abnormal Ct Scan    Angel Durán Jr. is a 64 y.o. male who presents with evaluation for an abnormal CT  Had an episode of hemoptysis in early December 2019, went to ED, told he had PNA. Had a little cough, went to , was told it was sinus issues and got a nose spray and an inhaler.  Then saw NP Mayorga at [facility] and was told it was PNA.  Never smoker, no lung problems personally.  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   Has a history of seizures, no recent incidents, no dysphagia or choking.    Interval history: Patient returns for a follow up visit. Denies respiratory complaints.     Review of Systems   Respiratory: Negative for shortness of breath and dyspnea on extertion.        Objective:       Vitals:    06/09/20 1456   BP: (!) 140/89   Pulse: 68   SpO2: 96%   Weight: 94.3 kg (207 lb 14.3 oz)   Height: 6' (1.829 m)       Physical Exam   Constitutional: He is oriented to person, place, and time. He appears well-developed and well-nourished. No distress.   Neurological: He is alert and oriented to person, place, and time.   Skin: Skin is warm and dry. He is not diaphoretic.   Nursing note and vitals reviewed.    Personal Diagnostic Review  none pertinent  No flowsheet data found.      Assessment:       No diagnosis found.    Outpatient Encounter Medications as of 6/9/2020   Medication Sig Dispense Refill    albuterol (PROVENTIL/VENTOLIN HFA) 90 mcg/actuation inhaler Inhale 2 puffs into the lungs every 4 (four) hours as needed for Wheezing or Shortness of Breath. Rescue 1 Inhaler 0    ascorbic acid, vitamin C, (VITAMIN C) 500 MG tablet Take 500 mg by mouth once daily.      azithromycin (Z-RODRIGO) 250 MG tablet Take 2 tablets by mouth on day 1; Take 1 tablet by mouth on days 2-5 6 tablet 0    PHENobarbital (LUMINAL) 32.4 MG tablet   5    phenobarbital in dextrose 5 % infusion phenobarbital    30MG----TAKE THREE DAILY      phenytoin (DILANTIN EXTENDED) 100 MG ER capsule Take 100 tablets by mouth 2 (two) times daily. 1 in AM, 2 in PM      phenytoin (DILANTIN) 30 MG ER capsule Take 30 mg by mouth every morning.        No facility-administered encounter medications on file as of 6/9/2020.      No orders of the defined types were placed in this encounter.      Plan:          Abnormal CT scan  RLL lung lesion biopsied with fibrosis. Today's CT with considerably smaller size of RLL lesion.   -Will await final CT read.   No need for further pulmonary follow up.   This lesion is resolving pneumonia. Patient no longer has symptoms and now has gained back his weight loss.     Sarkis Carr MD

## 2021-04-16 ENCOUNTER — PATIENT MESSAGE (OUTPATIENT)
Dept: RESEARCH | Facility: HOSPITAL | Age: 66
End: 2021-04-16

## 2023-01-03 ENCOUNTER — OFFICE VISIT (OUTPATIENT)
Dept: URGENT CARE | Facility: CLINIC | Age: 68
End: 2023-01-03
Payer: COMMERCIAL

## 2023-01-03 VITALS
RESPIRATION RATE: 18 BRPM | BODY MASS INDEX: 28.04 KG/M2 | DIASTOLIC BLOOD PRESSURE: 93 MMHG | HEIGHT: 72 IN | HEART RATE: 94 BPM | OXYGEN SATURATION: 95 % | WEIGHT: 207 LBS | SYSTOLIC BLOOD PRESSURE: 169 MMHG | TEMPERATURE: 99 F

## 2023-01-03 DIAGNOSIS — R05.9 COUGH, UNSPECIFIED TYPE: ICD-10-CM

## 2023-01-03 DIAGNOSIS — J40 BRONCHITIS: Primary | ICD-10-CM

## 2023-01-03 LAB
CTP QC/QA: YES
SARS-COV-2 AG RESP QL IA.RAPID: NEGATIVE

## 2023-01-03 PROCEDURE — 3077F SYST BP >= 140 MM HG: CPT | Mod: CPTII,S$GLB,, | Performed by: NURSE PRACTITIONER

## 2023-01-03 PROCEDURE — 3077F PR MOST RECENT SYSTOLIC BLOOD PRESSURE >= 140 MM HG: ICD-10-PCS | Mod: CPTII,S$GLB,, | Performed by: NURSE PRACTITIONER

## 2023-01-03 PROCEDURE — 3008F BODY MASS INDEX DOCD: CPT | Mod: CPTII,S$GLB,, | Performed by: NURSE PRACTITIONER

## 2023-01-03 PROCEDURE — 1126F AMNT PAIN NOTED NONE PRSNT: CPT | Mod: CPTII,S$GLB,, | Performed by: NURSE PRACTITIONER

## 2023-01-03 PROCEDURE — 87811 SARS CORONAVIRUS 2 ANTIGEN POCT, MANUAL READ: ICD-10-PCS | Mod: QW,S$GLB,, | Performed by: NURSE PRACTITIONER

## 2023-01-03 PROCEDURE — 1126F PR PAIN SEVERITY QUANTIFIED, NO PAIN PRESENT: ICD-10-PCS | Mod: CPTII,S$GLB,, | Performed by: NURSE PRACTITIONER

## 2023-01-03 PROCEDURE — 1160F RVW MEDS BY RX/DR IN RCRD: CPT | Mod: CPTII,S$GLB,, | Performed by: NURSE PRACTITIONER

## 2023-01-03 PROCEDURE — 3080F PR MOST RECENT DIASTOLIC BLOOD PRESSURE >= 90 MM HG: ICD-10-PCS | Mod: CPTII,S$GLB,, | Performed by: NURSE PRACTITIONER

## 2023-01-03 PROCEDURE — 1159F PR MEDICATION LIST DOCUMENTED IN MEDICAL RECORD: ICD-10-PCS | Mod: CPTII,S$GLB,, | Performed by: NURSE PRACTITIONER

## 2023-01-03 PROCEDURE — 87811 SARS-COV-2 COVID19 W/OPTIC: CPT | Mod: QW,S$GLB,, | Performed by: NURSE PRACTITIONER

## 2023-01-03 PROCEDURE — 3080F DIAST BP >= 90 MM HG: CPT | Mod: CPTII,S$GLB,, | Performed by: NURSE PRACTITIONER

## 2023-01-03 PROCEDURE — 1159F MED LIST DOCD IN RCRD: CPT | Mod: CPTII,S$GLB,, | Performed by: NURSE PRACTITIONER

## 2023-01-03 PROCEDURE — 99214 PR OFFICE/OUTPT VISIT, EST, LEVL IV, 30-39 MIN: ICD-10-PCS | Mod: S$GLB,,, | Performed by: NURSE PRACTITIONER

## 2023-01-03 PROCEDURE — 3008F PR BODY MASS INDEX (BMI) DOCUMENTED: ICD-10-PCS | Mod: CPTII,S$GLB,, | Performed by: NURSE PRACTITIONER

## 2023-01-03 PROCEDURE — 1160F PR REVIEW ALL MEDS BY PRESCRIBER/CLIN PHARMACIST DOCUMENTED: ICD-10-PCS | Mod: CPTII,S$GLB,, | Performed by: NURSE PRACTITIONER

## 2023-01-03 PROCEDURE — 99214 OFFICE O/P EST MOD 30 MIN: CPT | Mod: S$GLB,,, | Performed by: NURSE PRACTITIONER

## 2023-01-03 RX ORDER — DOXYCYCLINE 100 MG/1
100 CAPSULE ORAL 2 TIMES DAILY
Qty: 20 CAPSULE | Refills: 0 | Status: SHIPPED | OUTPATIENT
Start: 2023-01-03 | End: 2023-01-13

## 2023-01-03 RX ORDER — PROMETHAZINE HYDROCHLORIDE AND DEXTROMETHORPHAN HYDROBROMIDE 6.25; 15 MG/5ML; MG/5ML
5 SYRUP ORAL
Qty: 118 ML | Refills: 0 | Status: SHIPPED | OUTPATIENT
Start: 2023-01-03

## 2023-01-03 RX ORDER — BENZONATATE 200 MG/1
200 CAPSULE ORAL 3 TIMES DAILY PRN
Qty: 30 CAPSULE | Refills: 0 | Status: SHIPPED | OUTPATIENT
Start: 2023-01-03 | End: 2023-01-13

## 2023-01-03 NOTE — PROGRESS NOTES
Subjective:       Patient ID: Angel Durán Jr. is a 67 y.o. male.    Vitals:  height is 6' (1.829 m) and weight is 93.9 kg (207 lb). His tympanic temperature is 98.5 °F (36.9 °C). His blood pressure is 169/93 (abnormal) and his pulse is 94. His respiration is 18 and oxygen saturation is 95%.     Chief Complaint: Cough    Patient stated his cough started 2 week sago.  His fever started today and it was 101.  No exposure to covid or flu. He has pmh seizure disorder, lung lesion, pneumonia. He states he had uri symptoms two weeks ago that resolved. Fever was 100 today. He took tylenol and mucinex for his symptoms. He denies shortness of breath and wheezing.     Cough  This is a new problem. The current episode started 1 to 4 weeks ago. The problem has been unchanged. Associated symptoms include a fever. Pertinent negatives include no ear pain, headaches, nasal congestion or sore throat. Treatments tried: mucinex. The treatment provided mild relief. There is no history of asthma, bronchitis, COPD or pneumonia.     Constitution: Positive for fever.   HENT: Negative.  Negative for ear pain and sore throat.    Cardiovascular: Negative.    Eyes: Negative.    Respiratory:  Positive for cough.    Gastrointestinal: Negative.  Negative for bowel incontinence.   Endocrine: negative.   Genitourinary: Negative.  Negative for dysuria, flank pain, bladder incontinence and pelvic pain.   Musculoskeletal: Negative.  Negative for pain, abnormal ROM of joint and back pain.   Skin: Negative.    Allergic/Immunologic: Negative.    Neurological: Negative.  Negative for headaches.   Hematologic/Lymphatic: Negative.    Psychiatric/Behavioral: Negative.       Objective:      Physical Exam   Constitutional: He is oriented to person, place, and time. He appears well-developed. He is cooperative.  Non-toxic appearance. He does not appear ill. No distress.   HENT:   Head: Normocephalic and atraumatic.   Ears:   Right Ear: Hearing, tympanic  membrane, external ear and ear canal normal.   Left Ear: Hearing, tympanic membrane, external ear and ear canal normal.   Nose: Nose normal. No mucosal edema, rhinorrhea or nasal deformity. No epistaxis. Right sinus exhibits no maxillary sinus tenderness and no frontal sinus tenderness. Left sinus exhibits no maxillary sinus tenderness and no frontal sinus tenderness.   Mouth/Throat: Uvula is midline, oropharynx is clear and moist and mucous membranes are normal. No trismus in the jaw. Normal dentition. No uvula swelling. No oropharyngeal exudate, posterior oropharyngeal edema or posterior oropharyngeal erythema.   Eyes: Conjunctivae and lids are normal. No scleral icterus.   Neck: Trachea normal and phonation normal. Neck supple. No edema present. No erythema present. No neck rigidity present.   Cardiovascular: Normal rate, regular rhythm, normal heart sounds and normal pulses.   Pulmonary/Chest: Effort normal and breath sounds normal. No respiratory distress. He has no decreased breath sounds. He has no rhonchi.   Abdominal: Normal appearance.   Musculoskeletal: Normal range of motion.         General: No deformity. Normal range of motion.   Neurological: He is alert and oriented to person, place, and time. He exhibits normal muscle tone. Coordination normal.   Skin: Skin is warm, dry, intact, not diaphoretic and not pale.   Psychiatric: His speech is normal and behavior is normal. Judgment and thought content normal.   Nursing note and vitals reviewed.      Assessment:       1. Bronchitis    2. Cough, unspecified type          Plan:     Unable to get xray today- no tech on duty. Advised close follow up  Discussed promethazine at night sparingly as can lower the seizure threshold. Patient verbalized understanding.     Tessalon perles 200 mg tid.  OVER THE COUNTER RECOMMENDATIONS/SUGGESTIONS.    Make sure to stay well hydrated.    Use Nasal Saline to mechanically move any post nasal drip from your eustachian tube  or from the back of your throat.    Use warm salt water gargles to ease your throat pain. Warm salt water gargles as needed for sore throat-  1/2 tsp salt to 1 cup warm water, gargle as desired.    Use an antihistamine such as Claritin, Zyrtec or Allegra to dry you out.     Use pseudoephedrine (behind the counter) to decongest. Pseudoephedrine  30 mg up to 240 mg /day. It can raise your blood pressure and give you palpitations.    Use mucinex (guaifenisin) to break up mucous up to 2400mg/day to loosen any mucous.   The mucinex DM pill has a cough suppressant that can be sedating. It can be used at night to stop the tickle at the back of your throat.  You can use Mucinex D (it has guaifenesin and a high dose of pseudoephedrine) in the mornings to help decongest.      Use Afrin in each nare for no longer than 3 days, as it is addictive. It can also dry out your mucous membranes and cause elevated blood pressure. This is especially useful if you are flying.    Use Flonase 1-2 sprays/nostril per day. It is a local acting steroid nasal spray, if you develop a bloody nose, stop using the medication immediately.    Sometimes Nyquil at night is beneficial to help you get some rest, however it is sedating and it does have an antihistamine, and tylenol.    Honey is a natural cough suppressant that can be used.    Tylenol up to 4,000 mg a day is safe for short periods and can be used for body aches, pain, and fever. However in high doses and prolonged use it can cause liver irritation.    Ibuprofen is a non-steroidal anti-inflammatory that can be used for body aches, pain, and fever.However it can also cause stomach irritation if over used.        Bronchitis  -     promethazine-dextromethorphan (PROMETHAZINE-DM) 6.25-15 mg/5 mL Syrp; Take 5 mLs by mouth every 4 to 6 hours as needed (cough).  Dispense: 118 mL; Refill: 0  -     benzonatate (TESSALON) 200 MG capsule; Take 1 capsule (200 mg total) by mouth 3 (three) times daily as  needed for Cough.  Dispense: 30 capsule; Refill: 0  -     doxycycline (VIBRAMYCIN) 100 MG Cap; Take 1 capsule (100 mg total) by mouth 2 (two) times daily. for 10 days  Dispense: 20 capsule; Refill: 0    Cough, unspecified type  -     SARS Coronavirus 2 Antigen, POCT Manual Read

## 2023-07-27 NOTE — PROGRESS NOTES
Pt arrived to [facility] via stretcher on 2LNC, pt alert, dressing CDI, pt denies any pain or SOB, 1st xray for 1425, 3rd xray for 1525, [facility], report received from [facility]   no discharge, no irritation, no pain, no redness, and no visual changes.

## (undated) DEVICE — SEE L#120831

## (undated) DEVICE — CANISTER SUCTION 2 LTR

## (undated) DEVICE — CLIPPER BLADE MOD 4406 (CAREF)

## (undated) DEVICE — SUPPORT ULNA NERVE PROTECTOR

## (undated) DEVICE — PANTIES FEMININE NAPKIN LG/XLG

## (undated) DEVICE — GLOVE SURGICAL LATEX SZ 6.5

## (undated) DEVICE — SUT VICRYL CTD 2-0 GI 27 SH

## (undated) DEVICE — SUT CHROMIC 2-0 SH 27IN BRN

## (undated) DEVICE — Device

## (undated) DEVICE — SEE MEDLINE ITEM 157181

## (undated) DEVICE — TAPE SILK 3IN

## (undated) DEVICE — SEE MEDLINE ITEM 154981

## (undated) DEVICE — ADHESIVE DERMABOND ADVANCED

## (undated) DEVICE — BLANKET UPPER BODY 78.7X29.9IN

## (undated) DEVICE — ELECTRODE REM PLYHSV RETURN 9

## (undated) DEVICE — PACK ENDOSCOPY GENERAL